# Patient Record
Sex: MALE | Race: WHITE | NOT HISPANIC OR LATINO | Employment: OTHER | URBAN - METROPOLITAN AREA
[De-identification: names, ages, dates, MRNs, and addresses within clinical notes are randomized per-mention and may not be internally consistent; named-entity substitution may affect disease eponyms.]

---

## 2018-11-12 ENCOUNTER — CONSULT (OUTPATIENT)
Dept: CARDIOLOGY | Facility: CLINIC | Age: 68
End: 2018-11-12

## 2018-11-12 VITALS
SYSTOLIC BLOOD PRESSURE: 134 MMHG | DIASTOLIC BLOOD PRESSURE: 74 MMHG | HEIGHT: 69 IN | OXYGEN SATURATION: 93 % | HEART RATE: 63 BPM | WEIGHT: 188 LBS | BODY MASS INDEX: 27.85 KG/M2

## 2018-11-12 DIAGNOSIS — I49.3 PVC (PREMATURE VENTRICULAR CONTRACTION): Primary | ICD-10-CM

## 2018-11-12 DIAGNOSIS — I49.1 PAC (PREMATURE ATRIAL CONTRACTION): ICD-10-CM

## 2018-11-12 DIAGNOSIS — I49.3 PVC'S (PREMATURE VENTRICULAR CONTRACTIONS): ICD-10-CM

## 2018-11-12 PROCEDURE — 99204 OFFICE O/P NEW MOD 45 MIN: CPT | Performed by: PHYSICIAN ASSISTANT

## 2018-11-12 NOTE — PROGRESS NOTES
[facility]  INITIAL OFFICE CONSULT      Jose Hoffman  1950  PCP: Yury Mari MD    SUBJECTIVE:   Jose Hoffman is a 68 y.o. male seen for a consultation visit regarding the following: Palpitations, PVC's, PAC's    Chief Complaint:   Chief Complaint   Patient presents with   • Rapid Heart Rate        Consultation is requested by Dr. Mari for evaluation of Rapid Heart Rate    Problem List:  1. Palpitations   A)PVC's   B)  Remote stress test, evaluation with Dr. Martinez 2007   C)Holter 10/18   5% PVC's with occasional PAC's   D)Intolerance to Beta blockers  2. GERD  3. Diverticulosis  4. Lipomas   5. Questionable Sleep Apnea     History:  Mr. Hoffman is a pleasant 68-year-old gentleman who presents today for consult regarding history of palpitations.  He remotely had a workup with Dr. Martinez approximately 2007 2008.  This included a Holter monitor and subsequent stress test that was negative.  He is placed on Xarelto therapy for PVCs but cannot tolerate secondary to bradycardia fatigue symptoms.  He states overall he didn't doing well with palpitations there are happening on a somewhat regular basis but are not overly bothersome.  Recently however the past several months she's noticed that these symptoms have progressively become worse percent nighttime and tries to sleep.  He reports that when he tries M been falsely pill started having palpitations and this wakes him up in his.  Irritating for him and keeping him up at night.  He states otherwise able to be physically active without symptoms.  He denies dizziness, near-syncope or syncope.  Denies heart flushed symptoms.  He presented to his primary care provider who recommended a Holter monitor which revealed 5% PVCs with occasional couplets and PACs.  He states today that he was a monitor was not particular bad day.  He then was tried on parenteral therapy which is yet to start.  He was interstitial pursuing  "other modes of treatment for the PVCs of these are quite bothersome to him.    Cardiac PMH: (Old records have been reviewed and summarized below)        Past Medical History, Past Surgical History, Family history, Social History, and Medications were all reviewed with the patient today and updated as necessary.     Current Outpatient Medications   Medication Sig Dispense Refill   • verapamil SR (CALAN-SR) 120 MG CR tablet Take 120 mg by mouth Daily.  2     No current facility-administered medications for this visit.      No Known Allergies      Past Medical History:   Diagnosis Date   • Tachycardia      History reviewed. No pertinent surgical history.  History reviewed. No pertinent family history.  Social History     Tobacco Use   • Smoking status: Never Smoker   • Smokeless tobacco: Never Used   Substance Use Topics   • Alcohol use: Yes     Frequency: 4 or more times a week       ROS:  Review of Symptoms:  General: no recent weight loss/gain, weakness or fatigue  Skin: no rashes, lumps, or other skin changes  HEENT: no dizziness, lightheadedness, or vision changes  Respiratory: no cough or hemoptysis  Cardiovascular: + palpitations, and tachycardia  Gastrointestinal: no black/tarry stools or diarrhea  Urinary: no change in frequency or urgency  Peripheral Vascular: no claudication or leg cramps  Musculoskeletal: no muscle or joint pain/stiffness  Psychiatric: no depression or excessive stress  Neurological: no sensory or motor loss, no syncope  Hematologic: no anemia, easy bruising or bleeding  Endocrine: no thyroid problems, nor heat or cold intolerance         PHYSICAL EXAM:   /74 (BP Location: Right arm, Patient Position: Sitting)   Pulse 63   Ht 175.3 cm (69\")   Wt 85.3 kg (188 lb)   SpO2 93%   BMI 27.76 kg/m²      Wt Readings from Last 5 Encounters:   11/12/18 85.3 kg (188 lb)   10/25/16 81.6 kg (180 lb)     BP Readings from Last 5 Encounters:   11/12/18 134/74       General-Well Nourished, Well " developed  Eyes - PERRLA  Neck- supple, No mass  CV- regular rate and rhythm, no MRG  Lung- clear bilaterally  Abd- soft, +BS  Musc/skel - Norm strength and range of motion  Skin- warm and dry  Neuro - Alert & Oriented x 3, appropriate mood.    Medical problems and test results were reviewed with the patient today.       EKG:  (EKG/Tracing has been independently visualized by me and summarized below)  EKG reviewed from PCP revealing Normal sinus rhythm.   Procedures    ASSESSMENT   1. Palpitations: Holter 10/18 revealing PVC's. Intolerance to BB secondary to BB.   2. Questionable KHADIJAH-Sleep study.       PLAN  · GXT MPS  · Echocardiogram  · Zio Patch  · Sleep study  · Patient would like to try Verapamil per PCP.  He is interested in Consult with EP regarding PVC's and treatment options..  [id number], StartLn & [id number]          Cardiology/Electrophysiology  11/12/18  4:10 PM  Will Shane [place]ORD

## 2018-11-14 DIAGNOSIS — R94.31 NONSPECIFIC ABNORMAL ELECTROCARDIOGRAM (ECG) (EKG): Primary | ICD-10-CM

## 2018-12-04 ENCOUNTER — OFFICE VISIT (OUTPATIENT)
Dept: CARDIOLOGY | Facility: CLINIC | Age: 68
End: 2018-12-04

## 2018-12-04 VITALS
HEART RATE: 64 BPM | WEIGHT: 187 LBS | BODY MASS INDEX: 27.7 KG/M2 | HEIGHT: 69 IN | SYSTOLIC BLOOD PRESSURE: 142 MMHG | DIASTOLIC BLOOD PRESSURE: 86 MMHG

## 2018-12-04 DIAGNOSIS — I49.3 PVC'S (PREMATURE VENTRICULAR CONTRACTIONS): Primary | ICD-10-CM

## 2018-12-04 PROCEDURE — 99214 OFFICE O/P EST MOD 30 MIN: CPT | Performed by: INTERNAL MEDICINE

## 2018-12-04 NOTE — PROGRESS NOTES
Jose Hoffman  1950  PCP: Yury Mari MD    SUBJECTIVE:   Jose Hoffman is a 68 y.o. male seen for a follow up visit regarding the following:     Chief Complaint: Follow up for PVCs palpitations    HPI:    Since last visit the patient's status has not changed.  He continues to have palpitations worse at night.    History:   Mr. Hoffman is a pleasant 68-year-old gentleman who presented for consult regarding history of palpitations.  He remotely had a workup with Dr. Martinez approximately 2007 2008.  This included a Holter monitor and subsequent stress test that was negative.  He is placed on beta blocker therapy for PVCs but cannot tolerate secondary to bradycardia fatigue symptoms.  Recently however the past several months she's noticed that these symptoms have progressively become worse percent nighttime and tries to sleep.  Irritating for him and keeping him up at night.  He states otherwise able to be physically active without symptoms.  He denies dizziness, near-syncope or syncope.  Denies heart flushed symptoms.        Cardiac PMH: (Old records have been reviewed and summarized below)  1. Palpitations              A)PVC's              B)  Remote stress test, evaluation with Dr. Martinez 2007              C)Holter 10/18   5% PVC's with occasional PAC's              D)Intolerance to Beta blockers  2. GERD  3. Diverticulosis  4. Lipomas   5. Questionable Sleep Apnea   6. Cardiac monitor-November 2018-PVC burden 1.2%-symptoms didn't correlate to PVCs.        Current Outpatient Medications:   •  verapamil SR (CALAN-SR) 120 MG CR tablet, Take 1 tablet by mouth Daily., Disp: 30 tablet, Rfl: 2    Past Medical History, Past Surgical History, Family history, Social History, and Medications were all reviewed with the patient today and updated as necessary.       Patient Active Problem List   Diagnosis   • PVC's (premature ventricular contractions)   • PAC (premature atrial contraction)     No Known  "Allergies  Past Medical History:   Diagnosis Date   • Arrhythmia 2002    Several tests performed, by Dr. Kei Wilkerson   • Tachycardia      Past Surgical History:   Procedure Laterality Date   • CARDIAC CATHETERIZATION  2002    performed by Dr Wilkerson, to rule out pulmanary artery clot     Family History   Problem Relation Age of Onset   • Arrhythmia Mother    • Heart failure Mother    • Hypertension Mother    • Heart attack Father    • Hypertension Father    • Arrhythmia Sister    • Hypertension Sister    • Arrhythmia Maternal Grandmother    • Heart failure Maternal Grandmother    • Hypertension Maternal Grandmother    • Arrhythmia Maternal Grandfather    • Heart attack Maternal Grandfather    • Heart failure Maternal Grandfather    • Heart failure Paternal Grandfather    • Hypertension Paternal Grandmother      Social History     Tobacco Use   • Smoking status: Never Smoker   • Smokeless tobacco: Never Used   Substance Use Topics   • Alcohol use: Yes     Types: 3 Cans of beer, 4 Shots of liquor per week     Frequency: 4 or more times a week         PHYSICAL EXAM:    /86 (BP Location: Right arm, Patient Position: Sitting)   Pulse 64   Ht 175.3 cm (69\")   Wt 84.8 kg (187 lb)   BMI 27.62 kg/m²        Wt Readings from Last 5 Encounters:   12/04/18 84.8 kg (187 lb)   11/12/18 85.3 kg (188 lb)   10/25/16 81.6 kg (180 lb)       BP Readings from Last 5 Encounters:   12/04/18 142/86   11/12/18 134/74       General-Well Nourished, Well developed  Eyes - PERRLA  Neck- supple, No mass  CV- regular rate and rhythm, no MRG, No edema  Lung- clear bilaterally  Abd- soft, +BS  Musc/skel - Norm strength and range of motion  Skin- warm and dry  Neuro - Alert & Oriented x 3, appropriate mood.        Medical problems and test results were reviewed with the patient today.     No results found for this or any previous visit (from the past 672 hour(s)).      EKG: (EKG has been independently visualized by me and summarized " below)    Procedures     ASSESSMENT and PLAN  1.  PVCs-burden is not enough for ablation.  He does have symptoms correlated to the PVCs and is mostly bothered at night.  We will start him on verapamil 120 mg each day.  He has a echo and nuclear stress test scheduled.  If the verapamil is unable to suppress the ectopy we will start the patient on flecainide therapy.      Return in about 4 weeks (around 1/1/2019).        Reed Zabala M.D., F.A.C.C, F.H.R.S.  Cardiology/Electrophysiology  12/4/2018  12:39 PM

## 2018-12-10 ENCOUNTER — HOSPITAL ENCOUNTER (OUTPATIENT)
Dept: CARDIOLOGY | Facility: HOSPITAL | Age: 68
Discharge: HOME | End: 2018-12-10

## 2018-12-10 ENCOUNTER — HOSPITAL ENCOUNTER (OUTPATIENT)
Dept: CARDIOLOGY | Facility: HOSPITAL | Age: 68
Discharge: HOME | End: 2018-12-10
Admitting: PHYSICIAN ASSISTANT

## 2018-12-10 VITALS
BODY MASS INDEX: 27.69 KG/M2 | SYSTOLIC BLOOD PRESSURE: 130 MMHG | DIASTOLIC BLOOD PRESSURE: 68 MMHG | HEART RATE: 62 BPM | WEIGHT: 186.95 LBS | HEIGHT: 69 IN

## 2018-12-10 DIAGNOSIS — I49.3 PVC (PREMATURE VENTRICULAR CONTRACTION): ICD-10-CM

## 2018-12-10 DIAGNOSIS — R94.31 NONSPECIFIC ABNORMAL ELECTROCARDIOGRAM (ECG) (EKG): ICD-10-CM

## 2018-12-10 LAB
BH CV ECHO MEAS - AO ROOT DIAM: 3.6 CM
BH CV ECHO MEAS - EDV(CUBED): 96.3 ML
BH CV ECHO MEAS - EF(MOD-BP): 63 %
BH CV ECHO MEAS - ESV(CUBED): 37.9 ML
BH CV ECHO MEAS - IVS/LVPW: 1.1 CM
BH CV ECHO MEAS - IVSD: 1 CM
BH CV ECHO MEAS - LAT PEAK E' VEL: 11.5 CM/SEC
BH CV ECHO MEAS - LVIDD: 4.6 CM
BH CV ECHO MEAS - LVIDS: 3.1 CM
BH CV ECHO MEAS - LVOT DIAM: 2 CM
BH CV ECHO MEAS - LVPWD: 0.9 CM
BH CV ECHO MEAS - MED PEAK E' VEL: 6.8 CM/SEC
BH CV ECHO MEAS - MV A MAX VEL: 76 CM/SEC
BH CV ECHO MEAS - MV E MAX VEL: 67.6 CM/SEC
BH CV ECHO MEAS - MV E/A: 0.9
BH CV ECHO MEAS - PA ACC SLOPE: 587 CM/SEC2
BH CV ECHO MEAS - RAP SYSTOLE: 8 MMHG
BH CV ECHO MEAS - RVSP: 27 MMHG
BH CV ECHO MEAS - TAPSE (>1.6): 2.4 CM2
BH CV ECHO MEAS - TR MAX PG: 19
BH CV ECHO MEAS - TR MAX VEL: 219 CM/SEC
BH CV ECHO MEASUREMENTS AVERAGE E/E' RATIO: 7.39
BH CV NUCLEAR PRIOR STUDY: 2
BH CV STRESS BP STAGE 1: NORMAL
BH CV STRESS BP STAGE 2: NORMAL
BH CV STRESS BP STAGE 4: NORMAL
BH CV STRESS COMMENTS STAGE 1: NORMAL
BH CV STRESS DOSE REGADENOSON STAGE 1: 0.4
BH CV STRESS DURATION MIN STAGE 1: 1
BH CV STRESS DURATION MIN STAGE 2: 1
BH CV STRESS DURATION MIN STAGE 3: 1
BH CV STRESS DURATION MIN STAGE 4: 1
BH CV STRESS DURATION SEC STAGE 1: 10
BH CV STRESS DURATION SEC STAGE 2: 0
BH CV STRESS HR STAGE 1: 70
BH CV STRESS HR STAGE 2: 78
BH CV STRESS HR STAGE 3: 76
BH CV STRESS HR STAGE 4: 75
BH CV STRESS O2 STAGE 1: 100
BH CV STRESS PROTOCOL 1: NORMAL
BH CV STRESS RECOVERY BP: NORMAL MMHG
BH CV STRESS RECOVERY HR: 71 BPM
BH CV STRESS STAGE 1: 1
BH CV STRESS STAGE 2: 2
BH CV STRESS STAGE 3: 3
BH CV STRESS STAGE 4: 4
BH CV VAS BP LEFT ARM: NORMAL MMHG
BH CV XLRA - RV BASE: 4.7 CM
BH CV XLRA - RV LENGTH: 8.3 CM
BH CV XLRA - RV MID: 4 CM
BH CV XLRA - TDI S': 9.87 CM/SEC
IVRT: 108 MSEC
LEFT ATRIUM VOLUME INDEX: 25 ML/M2
LV EF 2D ECHO EST: 63 %
LV EF NUC BP: 64 %
MAXIMAL PREDICTED HEART RATE: 153 BPM
MAXIMAL PREDICTED HEART RATE: 153 BPM
PERCENT MAX PREDICTED HR: 52.29 %
STRESS BASELINE BP: NORMAL MMHG
STRESS BASELINE HR: 60 BPM
STRESS PERCENT HR: 62 %
STRESS POST PEAK BP: NORMAL MMHG
STRESS POST PEAK HR: 80 BPM
STRESS TARGET HR: 130 BPM
STRESS TARGET HR: 130 BPM

## 2018-12-10 PROCEDURE — 25010000002 REGADENOSON 0.4 MG/5ML SOLUTION: Performed by: PHYSICIAN ASSISTANT

## 2018-12-10 PROCEDURE — 0 RUBIDIUM CHLORIDE: Performed by: PHYSICIAN ASSISTANT

## 2018-12-10 PROCEDURE — A9555 RB82 RUBIDIUM: HCPCS | Performed by: PHYSICIAN ASSISTANT

## 2018-12-10 PROCEDURE — 78492 MYOCRD IMG PET MLT RST&STRS: CPT

## 2018-12-10 PROCEDURE — 93018 CV STRESS TEST I&R ONLY: CPT | Performed by: INTERNAL MEDICINE

## 2018-12-10 PROCEDURE — 78492 MYOCRD IMG PET MLT RST&STRS: CPT | Performed by: INTERNAL MEDICINE

## 2018-12-10 PROCEDURE — 93306 TTE W/DOPPLER COMPLETE: CPT

## 2018-12-10 PROCEDURE — 93306 TTE W/DOPPLER COMPLETE: CPT | Performed by: INTERNAL MEDICINE

## 2018-12-10 PROCEDURE — 93017 CV STRESS TEST TRACING ONLY: CPT

## 2018-12-10 RX ADMIN — REGADENOSON 0.4 MG: 0.08 INJECTION, SOLUTION INTRAVENOUS at 10:18

## 2018-12-10 RX ADMIN — RUBIDIUM CHLORIDE RB-82 1 DOSE: 150 INJECTION, SOLUTION INTRAVENOUS at 10:10

## 2018-12-10 RX ADMIN — RUBIDIUM CHLORIDE RB-82 1 DOSE: 150 INJECTION, SOLUTION INTRAVENOUS at 10:00

## 2018-12-11 ENCOUNTER — TELEPHONE (OUTPATIENT)
Dept: CARDIOLOGY | Facility: CLINIC | Age: 68
End: 2018-12-11

## 2018-12-11 NOTE — TELEPHONE ENCOUNTER
Message   Received: Today   Message Contents   Jose Lynn PA Childers, Tracy R, RN             Can you please let Mr. Hoffman know Stress test and Echo are acceptable.    Previous Messages             Patient notified and aware.

## 2019-01-08 ENCOUNTER — OFFICE VISIT (OUTPATIENT)
Dept: CARDIOLOGY | Facility: CLINIC | Age: 69
End: 2019-01-08

## 2019-01-08 VITALS
HEIGHT: 69 IN | HEART RATE: 58 BPM | DIASTOLIC BLOOD PRESSURE: 68 MMHG | BODY MASS INDEX: 28.41 KG/M2 | OXYGEN SATURATION: 98 % | WEIGHT: 191.8 LBS | SYSTOLIC BLOOD PRESSURE: 118 MMHG

## 2019-01-08 DIAGNOSIS — I49.3 PVC'S (PREMATURE VENTRICULAR CONTRACTIONS): Primary | ICD-10-CM

## 2019-01-08 PROCEDURE — 99214 OFFICE O/P EST MOD 30 MIN: CPT | Performed by: INTERNAL MEDICINE

## 2019-01-08 NOTE — PROGRESS NOTES
Jose Hoffman  1950  PCP: Yury Mari MD    SUBJECTIVE:   Jose Hoffman is a 68 y.o. male seen for a follow up visit regarding the following:     Chief Complaint: Follow up for PVCs palpitations    HPI:    Since last visit the patient's status has improved some. Tolerating meds    History:   Mr. Hoffman is a pleasant 68-year-old gentleman who presented for consult regarding history of palpitations.  He remotely had a workup with Dr. Martinez approximately 2007 2008.  This included a Holter monitor and subsequent stress test that was negative.  He is placed on beta blocker therapy for PVCs but cannot tolerate secondary to bradycardia fatigue symptoms.  Recently however the past several months she's noticed that these symptoms have progressively become worse percent nighttime and tries to sleep.  Irritating for him and keeping him up at night.  He states otherwise able to be physically active without symptoms.  He denies dizziness, near-syncope or syncope.  Denies heart flushed symptoms.        Cardiac PMH: (Old records have been reviewed and summarized below)  1. Palpitations              A)PVC's              B)  Remote stress test, evaluation with Dr. Martinez 2007              C)Holter 10/18   5% PVC's with occasional PAC's              D)Intolerance to Beta blockers  2. GERD  3. Diverticulosis  4. Lipomas   5. Questionable Sleep Apnea   6. Cardiac monitor-November 2018-PVC burden 1.2%-symptoms didn't correlate to PVCs.  7. Stress Test - Nov 2018 - Myocardial perfusion imaging indicates a normal myocardial perfusion study with no evidence of ischemia  8. ECHO - Nov 2018 - Left ventricular systolic function is normal. Calculated EF = 63%        Current Outpatient Medications:   •  verapamil SR (CALAN-SR) 120 MG CR tablet, Take 1 tablet by mouth Daily., Disp: 30 tablet, Rfl: 2    Past Medical History, Past Surgical History, Family history, Social History, and Medications were all reviewed with the  "patient today and updated as necessary.       Patient Active Problem List   Diagnosis   • PVC's (premature ventricular contractions)   • PAC (premature atrial contraction)     No Known Allergies  Past Medical History:   Diagnosis Date   • Arrhythmia 2002    Several tests performed, by Dr. Kei Wilkerson   • Tachycardia      Past Surgical History:   Procedure Laterality Date   • CARDIAC CATHETERIZATION  2002    performed by Dr Wilkerson, to rule out pulmanary artery clot     Family History   Problem Relation Age of Onset   • Arrhythmia Mother    • Heart failure Mother    • Hypertension Mother    • Heart attack Father    • Hypertension Father    • Arrhythmia Sister    • Hypertension Sister    • Arrhythmia Maternal Grandmother    • Heart failure Maternal Grandmother    • Hypertension Maternal Grandmother    • Arrhythmia Maternal Grandfather    • Heart attack Maternal Grandfather    • Heart failure Maternal Grandfather    • Heart failure Paternal Grandfather    • Hypertension Paternal Grandmother      Social History     Tobacco Use   • Smoking status: Never Smoker   • Smokeless tobacco: Never Used   Substance Use Topics   • Alcohol use: Yes     Alcohol/week: 1.2 - 4.2 oz     Types: 1 - 3 Cans of beer, 1 - 4 Shots of liquor per week     Frequency: 4 or more times a week     Comment: occas         PHYSICAL EXAM:    /68 (BP Location: Left arm, Patient Position: Sitting)   Pulse 58   Ht 175.3 cm (69\")   Wt 87 kg (191 lb 12.8 oz)   SpO2 98%   BMI 28.32 kg/m²        Wt Readings from Last 5 Encounters:   01/08/19 87 kg (191 lb 12.8 oz)   12/10/18 84.8 kg (186 lb 15.2 oz)   12/04/18 84.8 kg (187 lb)   11/12/18 85.3 kg (188 lb)   10/25/16 81.6 kg (180 lb)       BP Readings from Last 5 Encounters:   01/08/19 118/68   12/10/18 130/68   12/04/18 142/86   11/12/18 134/74       General-Well Nourished, Well developed  Eyes - PERRLA  Neck- supple, No mass  CV- regular rate and rhythm, no MRG, No edema  Lung- clear " bilaterally  Abd- soft, +BS  Musc/skel - Norm strength and range of motion  Skin- warm and dry  Neuro - Alert & Oriented x 3, appropriate mood.        Medical problems and test results were reviewed with the patient today.     No results found for this or any previous visit (from the past 672 hour(s)).      EKG: (EKG has been independently visualized by me and summarized below)    Procedures     ASSESSMENT and PLAN  1.  PVCs-burden is not enough for ablation.  He does have symptoms correlated to the PVCs and is mostly bothered at night.  We started him on verapamil 120 mg each day.  He had a echo and nuclear stress test with stable findings..  If the verapamil is unable to suppress the ectopy we will start the patient on flecainide therapy.      Return in about 6 months (around 7/8/2019).        Reed Zabala M.D., FASHWINC, F.H.R.S.  Cardiology/Electrophysiology  1/8/2019  11:13 AM

## 2019-01-16 ENCOUNTER — CONSULT (OUTPATIENT)
Dept: SLEEP MEDICINE | Facility: HOSPITAL | Age: 69
End: 2019-01-16

## 2019-01-16 VITALS
DIASTOLIC BLOOD PRESSURE: 67 MMHG | HEART RATE: 54 BPM | WEIGHT: 187.4 LBS | OXYGEN SATURATION: 95 % | HEIGHT: 69 IN | BODY MASS INDEX: 27.76 KG/M2 | SYSTOLIC BLOOD PRESSURE: 118 MMHG

## 2019-01-16 DIAGNOSIS — E66.3 OVERWEIGHT: ICD-10-CM

## 2019-01-16 DIAGNOSIS — R06.83 SNORING: Primary | ICD-10-CM

## 2019-01-16 DIAGNOSIS — G47.33 OBSTRUCTIVE SLEEP APNEA, ADULT: ICD-10-CM

## 2019-01-16 DIAGNOSIS — I49.3 PVC'S (PREMATURE VENTRICULAR CONTRACTIONS): ICD-10-CM

## 2019-01-16 PROCEDURE — 99203 OFFICE O/P NEW LOW 30 MIN: CPT | Performed by: INTERNAL MEDICINE

## 2019-01-16 NOTE — PROGRESS NOTES
Subjective   Jose Hoffman is a 68 y.o. male is being seen for consultation today at the request of KADEN Cadena for the evaluation of snoring and possible sleep-disordered breathing.  His primary care physician is Dr. Mari.  He is also seen by Dr. Zabala    History of Present Illness  Patient states he's been having palpitations for several years when he tries to rest.  He was diagnosed with PVCs.  He previously was on atenolol but it made him very sleepy was falling asleep at stop lights.  He is now on verapamil and doing better.  He sleeps better.  He says he still occasionally wakens with a sensation that his had PVC.  He is referred for evaluation of sleep-disordered breathing as possible contributing factor.  He also has a history of spinal stenosis and back pain awakens him.  He recently has taken 2 taping his mouth shut to help with his snoring.  He recently had a stress test that showed only some minor abnormalities.  He's been sleeping bed.  Separate from his wife he says due to her need for different temperature sleeping.    He says he's had snoring all of his life.  He denies having apneas.  He will awaken with a jolt but denies feeling like he is gasping for breath.  He's usually rested in the morning.  He denies morning headaches.  He is still sleepy during the day but is not falling asleep at stop lights now.  He has loud snoring it's worse when he saw on his back.  He says he usually sleeps on his side due to back pain.  He denies waking choking coughing or sore throat.  He denies trouble breathing through his nose.  He did break his nose at 13 years old.  He has a history of reflux that he controls with diet.  He denies hypnagogic hallucination or sleep paralysis.  He does have back pain but denies kicking or jerking his legs at night.  He says his weight is fairly stable.    He goes to bed about 11 PM and will fall asleep in 3-5 minutes.  He awakens 3-5 times during the night.  He  thinks he gets 5 and half to 6    Hours of sleep and generally feels fairly good.  He denies any history of diabetes or hypertension.  He is not been found to have coronary artery disease.  He does have PVCs and arthritis.  No Known Allergies       Current Outpatient Medications:   •  verapamil SR (CALAN-SR) 120 MG CR tablet, Take 1 tablet by mouth Daily., Disp: 30 tablet, Rfl: 2    Social History    Tobacco Use      Smoking status: Never Smoker      Smokeless tobacco: Never Used       Social History     Substance and Sexual Activity   Alcohol Use Yes   • Alcohol/week: 1.2 - 4.2 oz   • Types: 1 - 3 Cans of beer, 1 - 4 Shots of liquor per week   • Frequency: 4 or more times a week    Comment: occas       Caffeine: He has 4 cups coffee per day    Past Medical History:   Diagnosis Date   • Arrhythmia 2002    Several tests performed, by Dr. Kei Wilkerson   • Arthritis    • Tachycardia        Past Surgical History:   Procedure Laterality Date   • CARDIAC CATHETERIZATION  2002    performed by Dr Wilkerson, to rule out pulmanary artery clot   • TONSILLECTOMY     He's had an appendectomy several years ago    Family History   Problem Relation Age of Onset   • Arrhythmia Mother    • Heart failure Mother    • Hypertension Mother    • Heart disease Mother    • COPD Mother    • Heart attack Father    • Hypertension Father    • Heart disease Father    • Cancer Father         lung   • Arrhythmia Sister    • Hypertension Sister    • Sleep apnea Sister    • Arrhythmia Maternal Grandmother    • Heart failure Maternal Grandmother    • Hypertension Maternal Grandmother    • Arrhythmia Maternal Grandfather    • Heart attack Maternal Grandfather    • Heart failure Maternal Grandfather    • Heart failure Paternal Grandfather    • Hypertension Paternal Grandmother        The following portions of the patient's history were reviewed and updated as appropriate: allergies, current medications, past family history, past medical history, past  "social history, past surgical history and problem list.    Review of Systems   Constitutional: Negative.    HENT: Negative.    Eyes: Negative.    Respiratory: Negative.    Cardiovascular: Positive for palpitations.   Gastrointestinal: Negative.    Endocrine: Negative.    Genitourinary: Negative.    Musculoskeletal: Positive for arthralgias and back pain.   Skin: Negative.    Allergic/Immunologic: Negative.    Neurological: Negative.    Hematological: Negative.    Psychiatric/Behavioral: Negative.     [place] scores 11/24    Objective     /67   Pulse 54   Ht 175.3 cm (69\")   Wt 85 kg (187 lb 6.4 oz)   SpO2 95%   BMI 27.67 kg/m²      Physical Exam   Constitutional: He is oriented to person, place, and time. He appears well-developed and well-nourished.   He is slightly overweight   HENT:   Head: Normocephalic and atraumatic.   His nasal airway narrowing with some septal deviation to the left.  He has Mallampati class to anatomy   Eyes: EOM are normal. Pupils are equal, round, and reactive to light.   Neck: Normal range of motion. Neck supple.   Cardiovascular: Normal rate, regular rhythm and normal heart sounds.   Pulmonary/Chest: Effort normal and breath sounds normal.   Abdominal: Soft. Bowel sounds are normal.   Musculoskeletal: Normal range of motion. He exhibits no edema.   Neurological: He is alert and oriented to person, place, and time.   Skin: Skin is warm and dry.   Psychiatric: He has a normal mood and affect. His behavior is normal.         Assessment/Plan   Jose was seen today for sleeping problem.    Diagnoses and all orders for this visit:    Snoring  -     Polysomnography 4 or More Parameters; Future    Obstructive sleep apnea, adult  -     Polysomnography 4 or More Parameters; Future    PVC's (premature ventricular contractions)    Overweight     patient resents with a history of snoring and is had frequent PVCs noted in the past.  I think is fairly good story for obstructive sleep " apnea.  We'll plan to proceed to polysomnogram.  I've discussed potential therapies including CPAP, weight control, oral appliances, and surgery.  We have also discussed the long-term consequences of untreated obstructive sleep apnea.  He is return then after his study.    He is encouraged to achieve ideal body weight.  He is encouraged to avoid alcohol and sedatives close to bedtime.  He is encouraged practice lateral position sleep.         Yury Flynn MD [facility]  Sleep Medicine  Pulmonary and Critical Care Medicine

## 2019-01-22 ENCOUNTER — APPOINTMENT (OUTPATIENT)
Dept: SLEEP MEDICINE | Facility: HOSPITAL | Age: 69
End: 2019-01-22
Attending: INTERNAL MEDICINE

## 2019-07-16 ENCOUNTER — OFFICE VISIT (OUTPATIENT)
Dept: CARDIOLOGY | Facility: CLINIC | Age: 69
End: 2019-07-16

## 2019-07-16 VITALS
HEIGHT: 69 IN | WEIGHT: 194 LBS | DIASTOLIC BLOOD PRESSURE: 72 MMHG | SYSTOLIC BLOOD PRESSURE: 134 MMHG | OXYGEN SATURATION: 98 % | HEART RATE: 53 BPM | BODY MASS INDEX: 28.73 KG/M2

## 2019-07-16 DIAGNOSIS — I49.3 PVC'S (PREMATURE VENTRICULAR CONTRACTIONS): Primary | ICD-10-CM

## 2019-07-16 PROCEDURE — 99213 OFFICE O/P EST LOW 20 MIN: CPT | Performed by: INTERNAL MEDICINE

## 2019-07-16 NOTE — PROGRESS NOTES
Jose Hoffman  1950  PCP: Yury Mari MD    SUBJECTIVE:   Jose Hoffman is a 69 y.o. male seen for a follow up visit regarding the following:     Chief Complaint: Follow up for PVCs palpitations    HPI:    Since last visit the patient's status has improved some. Tolerating meds. Taking Verapamil as needed. Used 18 pills over 6 months    History:   Mr. Hoffman is a pleasant 69-year-old gentleman who presented for consult regarding history of palpitations.  He remotely had a workup with Dr. Martinez approximately 2446-9162.  This included a Holter monitor and subsequent stress test that was negative.  He is placed on beta blocker therapy for PVCs but cannot tolerate secondary to bradycardia fatigue symptoms.  Recently however the past several months she's noticed that these symptoms have progressively become worse percent nighttime and tries to sleep.  Irritating for him and keeping him up at night.  He states otherwise able to be physically active without symptoms.  He denies dizziness, near-syncope or syncope.  Denies heart flushed symptoms.        Cardiac PMH: (Old records have been reviewed and summarized below)  1. Palpitations              A)PVC's              B)Remote stress test, evaluation with Dr. Martinez 2007              C)Holter 10/18   5% PVC's with occasional PAC's              D)Intolerance to Beta blockers  2. GERD  3. Diverticulosis  4. Lipomas   5. Questionable Sleep Apnea   6. Cardiac monitor-November 2018-PVC burden 1.2%-symptoms didn't correlate to PVCs.  7. Stress Test - Nov 2018 - Myocardial perfusion imaging indicates a normal myocardial perfusion study with no evidence of ischemia  8. ECHO - Nov 2018 - Left ventricular systolic function is normal. Calculated EF = 63%        Current Outpatient Medications:   •  verapamil SR (CALAN-SR) 120 MG CR tablet, Take 1 tablet by mouth Daily. (Patient taking differently: Take 120 mg by mouth As Needed.), Disp: 30 tablet, Rfl: 2    Past  "Medical History, Past Surgical History, Family history, Social History, and Medications were all reviewed with the patient today and updated as necessary.       Patient Active Problem List   Diagnosis   • PVC's (premature ventricular contractions)   • PAC (premature atrial contraction)   • Snoring   • Overweight     No Known Allergies  Past Medical History:   Diagnosis Date   • Arrhythmia 2002    Several tests performed, by Dr. Kei Wilkerson   • Arthritis    • Tachycardia      Past Surgical History:   Procedure Laterality Date   • CARDIAC CATHETERIZATION  2002    performed by Dr Wilkerson, to rule out pulmanary artery clot   • TONSILLECTOMY       Family History   Problem Relation Age of Onset   • Arrhythmia Mother    • Heart failure Mother    • Hypertension Mother    • Heart disease Mother    • COPD Mother    • Heart attack Father    • Hypertension Father    • Heart disease Father    • Cancer Father         lung   • Arrhythmia Sister    • Hypertension Sister    • Sleep apnea Sister    • Arrhythmia Maternal Grandmother    • Heart failure Maternal Grandmother    • Hypertension Maternal Grandmother    • Arrhythmia Maternal Grandfather    • Heart attack Maternal Grandfather    • Heart failure Maternal Grandfather    • Heart failure Paternal Grandfather    • Hypertension Paternal Grandmother      Social History     Tobacco Use   • Smoking status: Never Smoker   • Smokeless tobacco: Never Used   Substance Use Topics   • Alcohol use: Yes     Alcohol/week: 1.2 - 4.2 oz     Types: 1 - 3 Cans of beer, 1 - 4 Shots of liquor per week     Frequency: 4 or more times a week     Comment: occas         PHYSICAL EXAM:    /72 (BP Location: Right arm, Patient Position: Sitting)   Pulse 53   Ht 175.3 cm (69\")   Wt 88 kg (194 lb)   SpO2 98%   BMI 28.65 kg/m²        Wt Readings from Last 5 Encounters:   07/16/19 88 kg (194 lb)   01/16/19 85 kg (187 lb 6.4 oz)   01/08/19 87 kg (191 lb 12.8 oz)   12/10/18 84.8 kg (186 lb 15.2 oz) "   12/04/18 84.8 kg (187 lb)       BP Readings from Last 5 Encounters:   07/16/19 134/72   01/16/19 118/67   01/08/19 118/68   12/10/18 130/68   12/04/18 142/86       General-Well Nourished, Well developed  Eyes - PERRLA  Neck- supple, No mass  CV- regular rate and rhythm, no MRG, No edema  Lung- clear bilaterally  Abd- soft, +BS  Musc/skel - Norm strength and range of motion  Skin- warm and dry  Neuro - Alert & Oriented x 3, appropriate mood.        Medical problems and test results were reviewed with the patient today.     No results found for this or any previous visit (from the past 672 hour(s)).      EKG: (EKG has been independently visualized by me and summarized below)    Procedures     ASSESSMENT and PLAN  1.  PVCs-burden is not enough for ablation.  He does have symptoms correlated to the PVCs and is mostly bothered at night.  We started him on verapamil 120 mg each day.  He had a echo and nuclear stress test with stable findings.  If the verapamil is unable to suppress the ectopy we will start the patient on flecainide therapy.  2. ? Sleep apnea - Did not get sleep study      Return in about 6 months (around 1/16/2020).        Reed Zabala M.D., F.A.C.C, F.H.R.S.  Cardiology/Electrophysiology  7/16/2019  9:53 AM

## 2020-03-17 ENCOUNTER — OFFICE VISIT (OUTPATIENT)
Dept: CARDIOLOGY | Facility: CLINIC | Age: 70
End: 2020-03-17

## 2020-03-17 VITALS
BODY MASS INDEX: 25.33 KG/M2 | HEART RATE: 60 BPM | OXYGEN SATURATION: 98 % | SYSTOLIC BLOOD PRESSURE: 120 MMHG | DIASTOLIC BLOOD PRESSURE: 64 MMHG | HEIGHT: 69 IN | WEIGHT: 171 LBS

## 2020-03-17 DIAGNOSIS — I49.3 PVC'S (PREMATURE VENTRICULAR CONTRACTIONS): ICD-10-CM

## 2020-03-17 DIAGNOSIS — I49.1 PAC (PREMATURE ATRIAL CONTRACTION): Primary | ICD-10-CM

## 2020-03-17 PROCEDURE — 99214 OFFICE O/P EST MOD 30 MIN: CPT | Performed by: INTERNAL MEDICINE

## 2020-03-17 PROCEDURE — 93000 ELECTROCARDIOGRAM COMPLETE: CPT | Performed by: INTERNAL MEDICINE

## 2020-03-17 NOTE — PROGRESS NOTES
Jose Hoffman  1950  PCP: Yury Mari MD    SUBJECTIVE:   Jose Hoffman is a 69 y.o. male seen for a follow up visit regarding the following:     Chief Complaint: Follow up for PVCs palpitations    HPI:    Since last visit the patient's status has improved some. Tolerating meds. Taking Verapamil as needed, reports that he rarely uses them. He has lost over 20 pounds.     History:   Mr. Hoffman is a pleasant 69-year-old gentleman who presented for consult regarding history of palpitations.  He remotely had a workup with Dr. Martinez approximately [id number].  This included a Holter monitor and subsequent stress test that was negative.  He is placed on beta blocker therapy for PVCs but cannot tolerate secondary to bradycardia fatigue symptoms.  Recently however the past several months she's noticed that these symptoms have progressively become worse percent nighttime and tries to sleep.  Irritating for him and keeping him up at night.  He states otherwise able to be physically active without symptoms.  He denies dizziness, near-syncope or syncope.  Denies heart flushed symptoms.        Cardiac PMH: (Old records have been reviewed and summarized below)  1. Palpitations              A)PVC's              B)Remote stress test, evaluation with Dr. Martinez 2007              C)Holter 10/18   5% PVC's with occasional PAC's              D)Intolerance to Beta blockers  2. GERD  3. Diverticulosis  4. Lipomas   5. Questionable Sleep Apnea   6. Cardiac monitor-November 2018-PVC burden 1.2%-symptoms didn't correlate to PVCs.  7. Stress Test - Nov 2018 - Myocardial perfusion imaging indicates a normal myocardial perfusion study with no evidence of ischemia  8. ECHO - Nov 2018 - Left ventricular systolic function is normal. Calculated EF = 63%        Current Outpatient Medications:   •  verapamil SR (CALAN-SR) 120 MG CR tablet, Take 1 tablet by mouth Daily., Disp: 30 tablet, Rfl: 11    Past Medical History, Past  "Surgical History, Family history, Social History, and Medications were all reviewed with the patient today and updated as necessary.       Patient Active Problem List   Diagnosis   • PVC's (premature ventricular contractions)   • PAC (premature atrial contraction)   • Snoring   • Overweight     No Known Allergies  Past Medical History:   Diagnosis Date   • Arrhythmia 2002    Several tests performed, by Dr. Kei Wilkerson   • Arthritis    • Tachycardia      Past Surgical History:   Procedure Laterality Date   • CARDIAC CATHETERIZATION  2002    performed by Dr Wilkerson, to rule out pulmanary artery clot   • TONSILLECTOMY       Family History   Problem Relation Age of Onset   • Arrhythmia Mother    • Heart failure Mother    • Hypertension Mother    • Heart disease Mother    • COPD Mother    • Heart attack Father    • Hypertension Father    • Heart disease Father    • Cancer Father         lung   • Arrhythmia Sister    • Hypertension Sister    • Sleep apnea Sister    • Arrhythmia Maternal Grandmother    • Heart failure Maternal Grandmother    • Hypertension Maternal Grandmother    • Arrhythmia Maternal Grandfather    • Heart attack Maternal Grandfather    • Heart failure Maternal Grandfather    • Heart failure Paternal Grandfather    • Hypertension Paternal Grandmother      Social History     Tobacco Use   • Smoking status: Never Smoker   • Smokeless tobacco: Never Used   Substance Use Topics   • Alcohol use: Yes     Alcohol/week: 2.0 - 7.0 standard drinks     Types: 1 - 3 Cans of beer, 1 - 4 Shots of liquor per week     Frequency: 4 or more times a week     Comment: occas         PHYSICAL EXAM:    /64 (BP Location: Left arm, Patient Position: Sitting)   Pulse 60   Ht 175.3 cm (69\")   Wt 77.6 kg (171 lb)   SpO2 98%   BMI 25.25 kg/m²        Wt Readings from Last 5 Encounters:   03/17/20 77.6 kg (171 lb)   07/16/19 88 kg (194 lb)   01/16/19 85 kg (187 lb 6.4 oz)   01/08/19 87 kg (191 lb 12.8 oz)   12/10/18 84.8 kg " (186 lb 15.2 oz)       BP Readings from Last 5 Encounters:   03/17/20 120/64   07/16/19 134/72   01/16/19 118/67   01/08/19 118/68   12/10/18 130/68       General-Well Nourished, Well developed  Eyes - PERRLA  Neck- supple, No mass  CV- regular rate and rhythm, no MRG, No edema  Lung- clear bilaterally  Abd- soft, +BS  Musc/skel - Norm strength and range of motion  Skin- warm and dry  Neuro - Alert & Oriented x 3, appropriate mood.        Medical problems and test results were reviewed with the patient today.     No results found for this or any previous visit (from the past 672 hour(s)).      EKG: (EKG has been independently visualized by me and summarized below)      ECG 12 Lead  Date/Time: 3/17/2020 9:31 AM  Performed by: Reed Zabala MD  Authorized by: Reed Zabala MD   Comparison: compared with previous ECG   Similar to previous ECG  Rhythm: sinus rhythm  Rate: normal  BPM: 59  ST Segments: ST segments normal  T Waves: T waves normal    Clinical impression: normal ECG             ASSESSMENT and PLAN  1.  PVCs-burden is not enough for ablation.  He does have symptoms correlated to the PVCs and is mostly bothered at night.  We started him on verapamil 120 mg each day.  He had a echo and nuclear stress test with stable findings.  If the verapamil is unable to suppress the ectopy we will start the patient on flecainide therapy.  2. ? Sleep apnea - Did not get sleep study. Reports that he has lost  3. Pre-op surgical clearance - Patient is low cardiac risk. Stable ECHO and Stress test findings.       Return in about 1 year (around 3/17/2021).        Reed Zabala M.D., F.A.C.C, F.H.R.S.  Cardiology/Electrophysiology  3/17/2020  09:31

## 2020-03-18 ENCOUNTER — TRANSCRIBE ORDERS (OUTPATIENT)
Dept: GENERAL RADIOLOGY | Facility: HOSPITAL | Age: 70
End: 2020-03-18

## 2020-03-18 ENCOUNTER — HOSPITAL ENCOUNTER (OUTPATIENT)
Dept: GENERAL RADIOLOGY | Facility: HOSPITAL | Age: 70
Discharge: HOME | End: 2020-03-18
Admitting: FAMILY MEDICINE

## 2020-03-18 DIAGNOSIS — Z01.818 PRE-OPERATIVE EXAM: ICD-10-CM

## 2020-03-18 DIAGNOSIS — Z01.818 PRE-OPERATIVE EXAM: Primary | ICD-10-CM

## 2020-03-18 PROCEDURE — 71046 X-RAY EXAM CHEST 2 VIEWS: CPT

## 2020-09-15 ENCOUNTER — TRANSCRIBE ORDERS (OUTPATIENT)
Dept: ADMINISTRATIVE | Facility: HOSPITAL | Age: 70
End: 2020-09-15

## 2020-09-15 ENCOUNTER — HOSPITAL ENCOUNTER (OUTPATIENT)
Dept: MRI IMAGING | Facility: HOSPITAL | Age: 70
Discharge: HOME | End: 2020-09-15
Admitting: FAMILY MEDICINE

## 2020-09-15 DIAGNOSIS — G45.9 TIA (TRANSIENT ISCHEMIC ATTACK): ICD-10-CM

## 2020-09-15 DIAGNOSIS — G45.9 TIA (TRANSIENT ISCHEMIC ATTACK): Primary | ICD-10-CM

## 2020-09-15 PROCEDURE — 70551 MRI BRAIN STEM W/O DYE: CPT

## 2021-02-09 ENCOUNTER — OFFICE VISIT (OUTPATIENT)
Dept: CARDIOLOGY | Facility: CLINIC | Age: 71
End: 2021-02-09

## 2021-02-09 VITALS
WEIGHT: 172.6 LBS | HEART RATE: 63 BPM | OXYGEN SATURATION: 98 % | BODY MASS INDEX: 25.56 KG/M2 | SYSTOLIC BLOOD PRESSURE: 118 MMHG | HEIGHT: 69 IN | DIASTOLIC BLOOD PRESSURE: 76 MMHG

## 2021-02-09 DIAGNOSIS — I49.3 PVC'S (PREMATURE VENTRICULAR CONTRACTIONS): Primary | ICD-10-CM

## 2021-02-09 DIAGNOSIS — I63.50 CEREBROVASCULAR ACCIDENT (CVA) DUE TO OCCLUSION OF CEREBRAL ARTERY (HCC): ICD-10-CM

## 2021-02-09 DIAGNOSIS — I49.1 PAC (PREMATURE ATRIAL CONTRACTION): ICD-10-CM

## 2021-02-09 PROCEDURE — 99214 OFFICE O/P EST MOD 30 MIN: CPT | Performed by: INTERNAL MEDICINE

## 2021-02-09 PROCEDURE — 93000 ELECTROCARDIOGRAM COMPLETE: CPT | Performed by: INTERNAL MEDICINE

## 2021-02-09 RX ORDER — ASPIRIN 81 MG/1
81 TABLET, CHEWABLE ORAL DAILY
COMMUNITY

## 2021-02-09 RX ORDER — MAGNESIUM OXIDE/MAG AA CHELATE 300 MG
1 CAPSULE ORAL DAILY
COMMUNITY

## 2021-02-09 RX ORDER — LISINOPRIL 5 MG/1
5 TABLET ORAL DAILY
COMMUNITY
Start: 2021-02-02 | End: 2022-09-13

## 2021-02-09 RX ORDER — ZINC 25 MG
TABLET ORAL
COMMUNITY
End: 2021-09-07

## 2021-02-09 NOTE — PROGRESS NOTES
Jose Hoffman  1950  PCP: Yury Mari MD    SUBJECTIVE:   Jose Hoffman is a 70 y.o. male seen for a follow up visit regarding the following:     Chief Complaint: Follow up for PVCs palpitations    HPI:    Since last visit the patient's status has improved some. He had a Hip replacement and a TIA secondary to small vessel dz.     History:   Mr. Hoffman is a pleasant 70-year-old gentleman who presented for consult regarding history of palpitations.  He remotely had a workup with Dr. Martinez approximately [id number].  This included a Holter monitor and subsequent stress test that was negative.  He is placed on beta blocker therapy for PVCs but cannot tolerate secondary to bradycardia fatigue symptoms.  Recently however the past several months she's noticed that these symptoms have progressively become worse percent nighttime and tries to sleep.  Irritating for him and keeping him up at night.  He states otherwise able to be physically active without symptoms.  He denies dizziness, near-syncope or syncope.  Denies heart flushed symptoms.        Cardiac PMH: (Old records have been reviewed and summarized below)  1. Palpitations              A)PVC's              B)Remote stress test, evaluation with Dr. Martinez 2007              C)Holter 10/18   5% PVC's with occasional PAC's              D)Intolerance to Beta blockers  2. GERD  3. Diverticulosis  4. Lipomas   5. Questionable Sleep Apnea   6. Cardiac monitor-November 2018-PVC burden 1.2%-symptoms didn't correlate to PVCs.  7. Stress Test - Nov 2018 - Myocardial perfusion imaging indicates a normal myocardial perfusion study with no evidence of ischemia  8. ECHO - Nov 2018 - Left ventricular systolic function is normal. Calculated EF = 63%        Current Outpatient Medications:   •  aspirin 81 MG chewable tablet, Chew 81 mg Daily., Disp: , Rfl:   •  KRILL OIL PO, Take 1,500 mg by mouth Daily., Disp: , Rfl:   •  lisinopril (PRINIVIL,ZESTRIL) 5 MG tablet,  Take 5 mg by mouth Daily., Disp: , Rfl:   •  Magnesium 300 MG capsule, Take 1 capsule by mouth Daily., Disp: , Rfl:   •  TURMERIC PO, Take 1,500 mg by mouth., Disp: , Rfl:   •  verapamil SR (CALAN-SR) 120 MG CR tablet, Take 1 tablet by mouth Daily. (Patient taking differently: Take 120 mg by mouth As Needed.), Disp: 30 tablet, Rfl: 11  •  vitamin D3 125 MCG (5000 UT) capsule capsule, Take 5,000 Units by mouth Daily., Disp: , Rfl:   •  Zinc 25 MG tablet, Take  by mouth., Disp: , Rfl:     Past Medical History, Past Surgical History, Family history, Social History, and Medications were all reviewed with the patient today and updated as necessary.       Patient Active Problem List   Diagnosis   • PVC's (premature ventricular contractions)   • PAC (premature atrial contraction)   • Snoring   • Overweight   • Cerebrovascular accident (CVA) due to occlusion of cerebral artery (CMS/HCC)     No Known Allergies  Past Medical History:   Diagnosis Date   • Arrhythmia 2002    Several tests performed, by Dr. Kei Wilkerson   • Arthritis    • Cerebrovascular accident (CVA) due to occlusion of cerebral artery (CMS/HCC) 2/9/2021   • Tachycardia      Past Surgical History:   Procedure Laterality Date   • CARDIAC CATHETERIZATION  2002    performed by Dr Wilkerson, to rule out pulmanary artery clot   • TONSILLECTOMY       Family History   Problem Relation Age of Onset   • Arrhythmia Mother    • Heart failure Mother    • Hypertension Mother    • Heart disease Mother    • COPD Mother    • Heart attack Father    • Hypertension Father    • Heart disease Father    • Cancer Father         lung   • Arrhythmia Sister    • Hypertension Sister    • Sleep apnea Sister    • Arrhythmia Maternal Grandmother    • Heart failure Maternal Grandmother    • Hypertension Maternal Grandmother    • Arrhythmia Maternal Grandfather    • Heart attack Maternal Grandfather    • Heart failure Maternal Grandfather    • Heart failure Paternal Grandfather    •  "Hypertension Paternal Grandmother      Social History     Tobacco Use   • Smoking status: Never Smoker   • Smokeless tobacco: Never Used   Substance Use Topics   • Alcohol use: Yes     Alcohol/week: 2.0 - 7.0 standard drinks     Types: 1 - 3 Cans of beer, 1 - 4 Shots of liquor per week     Frequency: 4 or more times a week     Comment: occas         PHYSICAL EXAM:    /76 (BP Location: Right arm, Patient Position: Sitting)   Pulse 63   Ht 175.3 cm (69\")   Wt 78.3 kg (172 lb 9.6 oz)   SpO2 98%   BMI 25.49 kg/m²        Wt Readings from Last 5 Encounters:   02/09/21 78.3 kg (172 lb 9.6 oz)   09/15/20 72.1 kg (159 lb)   03/17/20 77.6 kg (171 lb)   07/16/19 88 kg (194 lb)   01/16/19 85 kg (187 lb 6.4 oz)       BP Readings from Last 5 Encounters:   02/09/21 118/76   03/17/20 120/64   07/16/19 134/72   01/16/19 118/67   01/08/19 118/68       General-Well Nourished, Well developed  Eyes - PERRLA  Neck- supple, No mass  CV- regular rate and rhythm, no MRG, No edema  Lung- clear bilaterally  Abd- soft, +BS  Musc/skel - Norm strength and range of motion  Skin- warm and dry  Neuro - Alert & Oriented x 3, appropriate mood.        Medical problems and test results were reviewed with the patient today.     No results found for this or any previous visit (from the past 672 hour(s)).      EKG: (EKG has been independently visualized by me and summarized below)      ECG 12 Lead    Date/Time: 2/9/2021 8:42 AM  Performed by: Reed Zabala MD  Authorized by: Reed Zabala MD   Comparison: compared with previous ECG   Similar to previous ECG  Rhythm: sinus rhythm  Rate: normal  QRS axis: normal    Clinical impression: normal ECG             ASSESSMENT and PLAN  1.  PVCs-burden is not enough for ablation.  He does have symptoms correlated to the PVCs and is mostly bothered at night.  We started him on verapamil 120 mg each day.  He had a echo and nuclear stress test with stable findings.  If the verapamil is unable to " suppress the ectopy we will start the patient on flecainide therapy.  2. ? Sleep apnea - Did not get sleep study. Reports that he has lost wt  3. Pre-op surgical clearance - Patient is low cardiac risk. Stable ECHO and Stress test findings.   4. TIA - Followed by neurology - Had a work up at a outside hospital. Was told he didn't need a statin, just ASA. I disagree and feel he should be taking a statin - He will further discuss with his PCP.   5. HTN - Stable on Meds      Return in about 6 months (around 8/9/2021).        Reed Zabala M.D., F.CASE.C.C, F.H.R.S.  Cardiology/Electrophysiology  2/9/2021  08:42 EST

## 2021-02-18 ENCOUNTER — IMMUNIZATION (OUTPATIENT)
Dept: VACCINE CLINIC | Facility: HOSPITAL | Age: 71
End: 2021-02-18

## 2021-02-18 PROCEDURE — 0011A: CPT | Performed by: INTERNAL MEDICINE

## 2021-02-18 PROCEDURE — 91301 HC SARSCO02 VAC 100MCG/0.5ML IM: CPT | Performed by: INTERNAL MEDICINE

## 2021-03-29 ENCOUNTER — APPOINTMENT (OUTPATIENT)
Dept: VACCINE CLINIC | Facility: HOSPITAL | Age: 71
End: 2021-03-29

## 2021-03-29 ENCOUNTER — IMMUNIZATION (OUTPATIENT)
Dept: VACCINE CLINIC | Facility: HOSPITAL | Age: 71
End: 2021-03-29

## 2021-03-29 PROCEDURE — 91301 HC SARSCO02 VAC 100MCG/0.5ML IM: CPT | Performed by: INTERNAL MEDICINE

## 2021-03-29 PROCEDURE — 0012A: CPT | Performed by: INTERNAL MEDICINE

## 2021-09-07 ENCOUNTER — OFFICE VISIT (OUTPATIENT)
Dept: CARDIOLOGY | Facility: CLINIC | Age: 71
End: 2021-09-07

## 2021-09-07 VITALS
HEART RATE: 58 BPM | BODY MASS INDEX: 25.39 KG/M2 | OXYGEN SATURATION: 98 % | HEIGHT: 69 IN | SYSTOLIC BLOOD PRESSURE: 142 MMHG | DIASTOLIC BLOOD PRESSURE: 74 MMHG | WEIGHT: 171.4 LBS

## 2021-09-07 DIAGNOSIS — I49.1 PAC (PREMATURE ATRIAL CONTRACTION): Primary | ICD-10-CM

## 2021-09-07 DIAGNOSIS — I49.3 PVC'S (PREMATURE VENTRICULAR CONTRACTIONS): ICD-10-CM

## 2021-09-07 PROCEDURE — 99213 OFFICE O/P EST LOW 20 MIN: CPT | Performed by: STUDENT IN AN ORGANIZED HEALTH CARE EDUCATION/TRAINING PROGRAM

## 2021-09-07 NOTE — PROGRESS NOTES
Jose Hoffman  1950  PCP: Yury Mari MD    SUBJECTIVE:   Jose Hoffman is a 71 y.o. male seen for a follow up visit regarding the following:     Chief Complaint: Follow up for palps, pvcs, htn     HPI:    Mr. Patel is a 71 year old male who presents today for routine follow-up of his PVCs and PACs. He has lost 35 pounds and eliminated alcohol. His palpitations have completely resolved and he is only taking verapamil as needed. He is playing golf every day it does not rain. He feels great. No chest pain, sob, edema, palps, dizziness, syncope, TIA/CVA symptoms.     History:   Mr. Hoffman is a pleasant 70-year-old gentleman who presented for consult regarding history of palpitations.  He remotely had a workup with Dr. Martinez approximately 4206-1003.  This included a Holter monitor and subsequent stress test that was negative.  He is placed on beta blocker therapy for PVCs but cannot tolerate secondary to bradycardia fatigue symptoms.  Recently however the past several months she's noticed that these symptoms have progressively become worse percent nighttime and tries to sleep.  Irritating for him and keeping him up at night.  He states otherwise able to be physically active without symptoms.  He denies dizziness, near-syncope or syncope.  Denies heart flushed symptoms.          Cardiac PMH: (Old records have been reviewed and summarized below)  1. Palpitations              A)PVC's              B)Remote stress test, evaluation with Dr. Martinez 2007              C)Holter 10/18   5% PVC's with occasional PAC's              D)Intolerance to Beta blockers  2. GERD  3. Diverticulosis  4. Lipomas   5. Questionable Sleep Apnea   6. Cardiac monitor-November 2018-PVC burden 1.2%-symptoms didn't correlate to PVCs.  7. Stress Test - Nov 2018 - Myocardial perfusion imaging indicates a normal myocardial perfusion study with no evidence of ischemia  8. ECHO - Nov 2018 - Left ventricular systolic function is  normal. Calculated EF = 63%      Current Outpatient Medications:   •  aspirin 81 MG chewable tablet, Chew 81 mg Daily., Disp: , Rfl:   •  KRILL OIL PO, Take 1,500 mg by mouth Daily., Disp: , Rfl:   •  lisinopril (PRINIVIL,ZESTRIL) 5 MG tablet, Take 5 mg by mouth Daily., Disp: , Rfl:   •  Magnesium 300 MG capsule, Take 1 capsule by mouth Daily., Disp: , Rfl:   •  TURMERIC PO, Take 1,500 mg by mouth Daily., Disp: , Rfl:   •  verapamil SR (CALAN-SR) 120 MG CR tablet, Take 1 tablet by mouth Daily. (Patient taking differently: Take 120 mg by mouth As Needed.), Disp: 30 tablet, Rfl: 11    Past Medical History, Past Surgical History, Family history, Social History, and Medications were all reviewed with the patient today and updated as necessary.       Patient Active Problem List   Diagnosis   • PVC's (premature ventricular contractions)   • PAC (premature atrial contraction)   • Snoring   • Overweight   • Cerebrovascular accident (CVA) due to occlusion of cerebral artery (CMS/HCC)     No Known Allergies  Past Medical History:   Diagnosis Date   • Arrhythmia 2002    Several tests performed, by Dr. Kei Wilkerson   • Arthritis    • Cerebrovascular accident (CVA) due to occlusion of cerebral artery (CMS/HCC) 2/9/2021   • Tachycardia      Past Surgical History:   Procedure Laterality Date   • CARDIAC CATHETERIZATION  2002    performed by Dr Wilkerson, to rule out pulmanary artery clot   • TONSILLECTOMY       Family History   Problem Relation Age of Onset   • Arrhythmia Mother    • Heart failure Mother    • Hypertension Mother    • Heart disease Mother    • COPD Mother    • Heart attack Father    • Hypertension Father    • Heart disease Father    • Cancer Father         lung   • Arrhythmia Sister    • Hypertension Sister    • Sleep apnea Sister    • Arrhythmia Maternal Grandmother    • Heart failure Maternal Grandmother    • Hypertension Maternal Grandmother    • Arrhythmia Maternal Grandfather    • Heart attack Maternal  "Grandfather    • Heart failure Maternal Grandfather    • Heart failure Paternal Grandfather    • Hypertension Paternal Grandmother      Social History     Tobacco Use   • Smoking status: Never Smoker   • Smokeless tobacco: Never Used   Substance Use Topics   • Alcohol use: Yes     Alcohol/week: 2.0 - 7.0 standard drinks     Types: 1 - 3 Cans of beer, 1 - 4 Shots of liquor per week     Comment: occas         PHYSICAL EXAM:    /74 (BP Location: Left arm, Patient Position: Sitting)   Pulse 58   Ht 175.3 cm (69\")   Wt 77.7 kg (171 lb 6.4 oz)   SpO2 98%   BMI 25.31 kg/m²        Wt Readings from Last 5 Encounters:   09/07/21 77.7 kg (171 lb 6.4 oz)   02/09/21 78.3 kg (172 lb 9.6 oz)   09/15/20 72.1 kg (159 lb)   03/17/20 77.6 kg (171 lb)   07/16/19 88 kg (194 lb)       BP Readings from Last 5 Encounters:   09/07/21 142/74   02/09/21 118/76   03/17/20 120/64   07/16/19 134/72   01/16/19 118/67       General-Well Nourished, Well developed  Eyes - PERRLA  Neck- supple, No mass  CV- regular rate and rhythm, no MRG, No edema  Lung- clear bilaterally  Abd- soft, +BS  Musc/skel - Norm strength and range of motion  Skin- warm and dry  Neuro - Alert & Oriented x 3, appropriate mood.        Medical problems and test results were reviewed with the patient today.     No results found for this or any previous visit (from the past 672 hour(s)).      EKG: (EKG has been independently visualized by me and summarized below)    Procedures     ASSESSMENT and PLAN    1. PVCs  - mild burden, has not previously been enough for RFA   - echocardiogram and nuclear stress test within normal limits   - has lost 35 pounds and adjusted lifestyle- PVCs have resolved   - Verapamil PRN     2. HTN   -stable. Continue Lisinopril.     3. Hx of TIA  -secondary to small vessel disease. Followed by neuro   - continue ASA. Patient should be on statin. To discuss with PCP     Return in about 1 year (around 9/7/2022).        Sheri Quezada PA-C "   Cardiology/Electrophysiology  9/7/2021  09:47 EDT

## 2022-05-16 ENCOUNTER — TRANSCRIBE ORDERS (OUTPATIENT)
Dept: ADMINISTRATIVE | Facility: HOSPITAL | Age: 72
End: 2022-05-16

## 2022-05-16 DIAGNOSIS — G45.9 TIA (TRANSIENT ISCHEMIC ATTACK): Primary | ICD-10-CM

## 2022-05-18 ENCOUNTER — HOSPITAL ENCOUNTER (OUTPATIENT)
Dept: CARDIOLOGY | Facility: HOSPITAL | Age: 72
Discharge: HOME | End: 2022-05-18
Admitting: PSYCHIATRY & NEUROLOGY

## 2022-05-18 VITALS — HEIGHT: 69 IN | WEIGHT: 171 LBS | BODY MASS INDEX: 25.33 KG/M2

## 2022-05-18 DIAGNOSIS — G45.9 TIA (TRANSIENT ISCHEMIC ATTACK): ICD-10-CM

## 2022-05-18 LAB
BH CV XLRA MEAS LEFT DIST CCA EDV: 17.6 CM/SEC
BH CV XLRA MEAS LEFT DIST CCA PSV: 81.5 CM/SEC
BH CV XLRA MEAS LEFT DIST ICA EDV: -22.9 CM/SEC
BH CV XLRA MEAS LEFT DIST ICA PSV: -79.2 CM/SEC
BH CV XLRA MEAS LEFT ICA/CCA RATIO: 0.93
BH CV XLRA MEAS LEFT MID CCA EDV: 22 CM/SEC
BH CV XLRA MEAS LEFT MID CCA PSV: 94.3 CM/SEC
BH CV XLRA MEAS LEFT MID ICA EDV: -30.5 CM/SEC
BH CV XLRA MEAS LEFT MID ICA PSV: -87.4 CM/SEC
BH CV XLRA MEAS LEFT PROX CCA EDV: 25.1 CM/SEC
BH CV XLRA MEAS LEFT PROX CCA PSV: 122 CM/SEC
BH CV XLRA MEAS LEFT PROX ECA EDV: 18.1 CM/SEC
BH CV XLRA MEAS LEFT PROX ECA PSV: 130 CM/SEC
BH CV XLRA MEAS LEFT PROX ICA EDV: 18.2 CM/SEC
BH CV XLRA MEAS LEFT PROX ICA PSV: 86.2 CM/SEC
BH CV XLRA MEAS LEFT PROX SCLA EDV: 9.43 CM/SEC
BH CV XLRA MEAS LEFT PROX SCLA PSV: 130 CM/SEC
BH CV XLRA MEAS LEFT VERTEBRAL A EDV: 12.1 CM/SEC
BH CV XLRA MEAS LEFT VERTEBRAL A PSV: 45.9 CM/SEC
BH CV XLRA MEAS RIGHT DIST CCA EDV: 14.9 CM/SEC
BH CV XLRA MEAS RIGHT DIST CCA PSV: 66 CM/SEC
BH CV XLRA MEAS RIGHT DIST ICA EDV: -34 CM/SEC
BH CV XLRA MEAS RIGHT DIST ICA PSV: -99.7 CM/SEC
BH CV XLRA MEAS RIGHT ICA/CCA RATIO: 1.2
BH CV XLRA MEAS RIGHT MID CCA EDV: 17.7 CM/SEC
BH CV XLRA MEAS RIGHT MID CCA PSV: 70.3 CM/SEC
BH CV XLRA MEAS RIGHT MID ICA EDV: -33.4 CM/SEC
BH CV XLRA MEAS RIGHT MID ICA PSV: -86.8 CM/SEC
BH CV XLRA MEAS RIGHT PROX CCA EDV: 19.3 CM/SEC
BH CV XLRA MEAS RIGHT PROX CCA PSV: 74.5 CM/SEC
BH CV XLRA MEAS RIGHT PROX ECA EDV: -18.1 CM/SEC
BH CV XLRA MEAS RIGHT PROX ECA PSV: -117 CM/SEC
BH CV XLRA MEAS RIGHT PROX ICA EDV: 21.2 CM/SEC
BH CV XLRA MEAS RIGHT PROX ICA PSV: 71.1 CM/SEC
BH CV XLRA MEAS RIGHT PROX SCLA EDV: 7.86 CM/SEC
BH CV XLRA MEAS RIGHT PROX SCLA PSV: 160 CM/SEC
BH CV XLRA MEAS RIGHT VERTEBRAL A EDV: 13.3 CM/SEC
BH CV XLRA MEAS RIGHT VERTEBRAL A PSV: 46.2 CM/SEC
LEFT ARM BP: NORMAL MMHG
MAXIMAL PREDICTED HEART RATE: 149 BPM
RIGHT ARM BP: NORMAL MMHG
STRESS TARGET HR: 127 BPM

## 2022-05-18 PROCEDURE — 93880 EXTRACRANIAL BILAT STUDY: CPT

## 2022-05-18 PROCEDURE — 93880 EXTRACRANIAL BILAT STUDY: CPT | Performed by: INTERNAL MEDICINE

## 2022-09-13 ENCOUNTER — OFFICE VISIT (OUTPATIENT)
Dept: CARDIOLOGY | Facility: CLINIC | Age: 72
End: 2022-09-13

## 2022-09-13 VITALS
WEIGHT: 176 LBS | SYSTOLIC BLOOD PRESSURE: 134 MMHG | HEIGHT: 69 IN | HEART RATE: 68 BPM | BODY MASS INDEX: 26.07 KG/M2 | OXYGEN SATURATION: 99 % | DIASTOLIC BLOOD PRESSURE: 74 MMHG

## 2022-09-13 DIAGNOSIS — I49.3 PVC'S (PREMATURE VENTRICULAR CONTRACTIONS): Primary | ICD-10-CM

## 2022-09-13 PROCEDURE — 99213 OFFICE O/P EST LOW 20 MIN: CPT | Performed by: STUDENT IN AN ORGANIZED HEALTH CARE EDUCATION/TRAINING PROGRAM

## 2022-09-13 NOTE — PROGRESS NOTES
Jose Hoffman  1950  PCP: Yury Mari MD    SUBJECTIVE:   Jose Hoffman is a 72 y.o. male seen for a follow up visit regarding the following:     Chief Complaint: Follow up for palps, pvcs, htn     HPI:    Mr. Patel is a 71 year old male who presents today for routine follow-up of his PVCs.  He was last seen approximately 1 year ago.  He is overall done quite well since then.  He has no further sensation of having any PVCs.  The only thing is notices that on his apple watch it often notices that his heart rate is low.  This is often at night or when he first wakes up.  He has no symptoms related to this.  He remains very active and is not limited at all.  He denies chest pain, shortness of breath, dyspnea on exertion, palpitations, orthopnea, PND, or lower extremity swelling.         History:   Mr. Hoffman is a pleasant 70-year-old gentleman who presented for consult regarding history of palpitations.  He remotely had a workup with Dr. Martinez approximately 1354-6328.  This included a Holter monitor and subsequent stress test that was negative.  He is placed on beta blocker therapy for PVCs but cannot tolerate secondary to bradycardia fatigue symptoms.  Recently however the past several months she's noticed that these symptoms have progressively become worse percent nighttime and tries to sleep.  Irritating for him and keeping him up at night.  He states otherwise able to be physically active without symptoms.  He denies dizziness, near-syncope or syncope.  Denies heart flushed symptoms.          Cardiac PMH: (Old records have been reviewed and summarized below)  1. Palpitations              A)PVC's              B)Remote stress test, evaluation with Dr. Martinez 2007              C)Holter 10/18   5% PVC's with occasional PAC's              D)Intolerance to Beta blockers  2. GERD  3. Diverticulosis  4. Lipomas   5. Questionable Sleep Apnea   6. Cardiac monitor-November 2018-PVC burden 1.2%-symptoms  didn't correlate to PVCs.  7. Stress Test - Nov 2018 - Myocardial perfusion imaging indicates a normal myocardial perfusion study with no evidence of ischemia  8. ECHO - Nov 2018 - Left ventricular systolic function is normal. Calculated EF = 63%      Current Outpatient Medications:   •  aspirin 81 MG chewable tablet, Chew 81 mg Daily., Disp: , Rfl:   •  KRILL OIL PO, Take 1,500 mg by mouth Daily., Disp: , Rfl:   •  Magnesium 300 MG capsule, Take 1 capsule by mouth Daily., Disp: , Rfl:   •  TURMERIC PO, Take 1,500 mg by mouth Daily., Disp: , Rfl:     Past Medical History, Past Surgical History, Family history, Social History, and Medications were all reviewed with the patient today and updated as necessary.       Patient Active Problem List   Diagnosis   • PVC's (premature ventricular contractions)   • PAC (premature atrial contraction)   • Snoring   • Overweight   • Cerebrovascular accident (CVA) due to occlusion of cerebral artery (HCC)     Allergies   Allergen Reactions   • Pollen Extract Other (See Comments)     Seasonal allergies     Past Medical History:   Diagnosis Date   • Arrhythmia 2002    Several tests performed, by Dr. Kei Wilkerson   • Arthritis    • Cerebrovascular accident (CVA) due to occlusion of cerebral artery (HCC) 02/09/2021   • Tachycardia      Past Surgical History:   Procedure Laterality Date   • CARDIAC CATHETERIZATION  2002    performed by Dr Wilkerson, to rule out pulmanary artery clot   • COLONOSCOPY N/A 09/2022   • TONSILLECTOMY     • TOTAL HIP ARTHROPLASTY Left 2020     Family History   Problem Relation Age of Onset   • Arrhythmia Mother    • Heart failure Mother    • Hypertension Mother    • Heart disease Mother    • COPD Mother    • Heart attack Father    • Hypertension Father    • Heart disease Father    • Cancer Father         lung   • Arrhythmia Sister    • Hypertension Sister    • Sleep apnea Sister    • Arrhythmia Maternal Grandmother    • Heart failure Maternal Grandmother    •  "Hypertension Maternal Grandmother    • Arrhythmia Maternal Grandfather    • Heart attack Maternal Grandfather    • Heart failure Maternal Grandfather    • Heart failure Paternal Grandfather    • Hypertension Paternal Grandmother      Social History     Tobacco Use   • Smoking status: Never Smoker   • Smokeless tobacco: Never Used   Substance Use Topics   • Alcohol use: Yes     Alcohol/week: 3.0 - 6.0 standard drinks     Types: 2 Glasses of wine, 1 - 4 Shots of liquor per week     Comment: occas         PHYSICAL EXAM:    /74 (BP Location: Right arm, Patient Position: Sitting, Cuff Size: Adult)   Pulse 68   Ht 175.3 cm (69\")   Wt 79.8 kg (176 lb)   SpO2 99%   BMI 25.99 kg/m²        Wt Readings from Last 5 Encounters:   09/13/22 79.8 kg (176 lb)   05/18/22 77.6 kg (171 lb)   09/07/21 77.7 kg (171 lb 6.4 oz)   02/09/21 78.3 kg (172 lb 9.6 oz)   09/15/20 72.1 kg (159 lb)       BP Readings from Last 5 Encounters:   09/13/22 134/74   09/07/21 142/74   02/09/21 118/76   03/17/20 120/64   07/16/19 134/72       General-Well Nourished, Well developed  Eyes - PERRLA  Neck- supple, No mass  CV- regular rate and rhythm, no MRG, No edema  Lung- clear bilaterally  Abd- soft, +BS  Musc/skel - Norm strength and range of motion  Skin- warm and dry  Neuro - Alert & Oriented x 3, appropriate mood.        Medical problems and test results were reviewed with the patient today.     No results found for this or any previous visit (from the past 672 hour(s)).      ASSESSMENT and PLAN    1. PVCs  -Previously mild burden with symptoms.  These have almost resolved with lifestyle changes.  Not taking any medications currently    2. HTN   -Now off of antihypertensive medicines after weight loss.  He checks his blood pressure at home and has been well controlled.    3. Hx of TIA  -secondary to small vessel disease. Followed by neuro   - continue ASA    Return if symptoms worsen or fail to improve.      "

## 2023-06-25 PROBLEM — Z86.73 H/O TIA (TRANSIENT ISCHEMIC ATTACK) AND STROKE: Status: ACTIVE | Noted: 2023-06-25

## 2023-06-25 PROBLEM — Z86.73 H/O: CVA (CEREBROVASCULAR ACCIDENT): Status: ACTIVE | Noted: 2023-06-25

## 2023-06-25 PROBLEM — K21.9 GERD (GASTROESOPHAGEAL REFLUX DISEASE): Status: ACTIVE | Noted: 2023-06-25

## 2023-06-25 PROBLEM — R29.818 SUSPECTED SLEEP APNEA: Status: ACTIVE | Noted: 2023-06-25

## 2023-06-25 PROBLEM — I63.9 ACUTE ISCHEMIC STROKE: Status: ACTIVE | Noted: 2023-06-25

## 2023-09-14 ENCOUNTER — CALL CENTER PROGRAMS (OUTPATIENT)
Dept: CALL CENTER | Facility: HOSPITAL | Age: 73
End: 2023-09-14
Payer: MEDICARE

## 2023-09-14 NOTE — OUTREACH NOTE
Stroke Faustino Survey      Flowsheet Row Responses   Facility patient discharged from? [facility]   Attempt successful Yes   Call start time 1552   Person spoke with today (if not patient) and relationship Patient   Call end time 1556   Patient location 30 days post discharge if known Home   Was the patient readmitted within 30 days of discharge? No   Could you live alone without any help from another person? Yes   Can you do everything that you were doing right before your stroke even if slower and not as much? Yes   Are you completely back to the way you were right before your stroke? Yes  [Denies any residual stroke symptoms. ]   Can you walk from one room to another without help from another person? Yes   Can the patient sit up in bed without any help? Yes   Call Center Milam Score 0   Faustino score call completed Yes   Comments No residual stroke symptoms reported by patient.            [facility] - Registered Nurse

## 2023-11-15 ENCOUNTER — OFFICE VISIT (OUTPATIENT)
Dept: NEUROLOGY | Facility: CLINIC | Age: 73
End: 2023-11-15
Payer: MEDICARE

## 2023-11-15 VITALS
OXYGEN SATURATION: 98 % | SYSTOLIC BLOOD PRESSURE: 114 MMHG | WEIGHT: 182 LBS | TEMPERATURE: 98.7 F | BODY MASS INDEX: 26.96 KG/M2 | HEIGHT: 69 IN | HEART RATE: 58 BPM | DIASTOLIC BLOOD PRESSURE: 62 MMHG

## 2023-11-15 DIAGNOSIS — I63.50 CEREBROVASCULAR ACCIDENT (CVA) DUE TO OCCLUSION OF CEREBRAL ARTERY: Primary | ICD-10-CM

## 2023-11-15 DIAGNOSIS — Z86.73 H/O TIA (TRANSIENT ISCHEMIC ATTACK) AND STROKE: ICD-10-CM

## 2023-11-15 DIAGNOSIS — I63.9 ACUTE ISCHEMIC STROKE: ICD-10-CM

## 2023-11-15 DIAGNOSIS — R29.818 SUSPECTED SLEEP APNEA: ICD-10-CM

## 2023-11-15 RX ORDER — CHOLECALCIFEROL (VITAMIN D3) 25 MCG
TABLET,CHEWABLE ORAL
COMMUNITY

## 2023-11-15 RX ORDER — ROSUVASTATIN CALCIUM 5 MG/1
5 TABLET, COATED ORAL
COMMUNITY
Start: 2023-10-24

## 2023-11-15 RX ORDER — EZETIMIBE 10 MG/1
10 TABLET ORAL DAILY
Qty: 30 TABLET | Refills: 11 | Status: SHIPPED | OUTPATIENT
Start: 2023-11-15 | End: 2024-11-14

## 2023-11-15 NOTE — PROGRESS NOTES
New Patient Office Visit      Encounter Date: 11/15/2023   Patient Name: Jose Hoffman  : 1950   MRN: [id number]   PCP: Yury Mari MD    Referring Provider: No ref. provider found     Chief Complaint:    Chief Complaint   Patient presents with    Follow-up    Cerebrovascular Accident       History of Present Illness: Jose Hoffman is a 73 y.o. male with past medical history significant for HLD and remote TIA presents to the clinic today for initial follow-up s/p TNK aborted stroke in 2023.  Patient initially presented to [facility] ED 2023 with acute onset expressive aphasia.  Patient was found to have 7 mL perfusion deficit in posterior region of left temporal lobe.  Patient received TNK and symptoms resolved.  MRI brain on  negative for stroke.      TTE on  was negative for PFO and otherwise unremarkable.  CTA of head and neck on  with no evidence of stenosis or occlusion.  Patient was discharged home on  with daily aspirin and high intensity statin.  Post discharge Holter monitor results no evidence of A-fib.  Of note, patient reported severe myalgia s/p atorvastatin 80 mg.  Patient has continued to fail de-escalating doses of atorvastatin and Crestor in the setting of persistent myalgia.  Patient states follow-up LDL result of 50 per his PCP.    Patient presents today with no complaints other than the myalgias noted above.  Patient patient reports intermittent bilateral posterior headaches for approximately 1 month following discharge from hospital.  Headache symptoms have resolved and not recurred.  He denies vision changes, speech difficulty, extremity weakness, and sensory deficits.    Stroke Risk Factors: hyperlipidemia      Subjective      Review of Systems:   Review of Systems   Constitutional:  Negative for activity change.   Eyes:  Negative for visual disturbance.   Respiratory: Negative.     Cardiovascular:  Negative for palpitations.   Gastrointestinal: Negative.     Musculoskeletal:  Positive for myalgias.   Skin: Negative.    Neurological: Negative.    Psychiatric/Behavioral: Negative.         Past Medical History:   Past Medical History:   Diagnosis Date    Arrhythmia 2002    Several tests performed, by Dr. Kei Wilkerson    Arthritis     Cerebrovascular accident (CVA) due to occlusion of cerebral artery 02/09/2021    Factor 5 Leiden mutation, heterozygous     Headache, tension-type Past 3 years    HL (hearing loss) Past 3 years    Migraine 1990    Tachycardia     TIA (transient ischemic attack) 06/25/2023    Vision loss 06/25/2023       Past Surgical History:   Past Surgical History:   Procedure Laterality Date    CARDIAC CATHETERIZATION  2002    performed by Dr Wilkerson, to rule out pulmanary artery clot    COLONOSCOPY N/A 09/2022    TONSILLECTOMY      TOTAL HIP ARTHROPLASTY Left 2020       Family History:   Family History   Problem Relation Age of Onset    Arrhythmia Mother     Heart failure Mother     Hypertension Mother     Heart disease Mother     COPD Mother     Migraines Mother     Neuropathy Mother     Heart attack Father     Hypertension Father     Heart disease Father     Cancer Father         lung    Arrhythmia Sister     Hypertension Sister     Sleep apnea Sister     Arrhythmia Maternal Grandmother     Heart failure Maternal Grandmother     Hypertension Maternal Grandmother     Arrhythmia Maternal Grandfather     Heart attack Maternal Grandfather     Heart failure Maternal Grandfather     Heart failure Paternal Grandfather     Hypertension Paternal Grandmother        Social History:   Social History     Socioeconomic History    Marital status:    Tobacco Use    Smoking status: Never     Passive exposure: Never    Smokeless tobacco: Never   Vaping Use    Vaping Use: Never used   Substance and Sexual Activity    Alcohol use: Yes     Alcohol/week: 3.0 - 6.0 standard drinks of alcohol     Types: 2 Glasses of wine, 1 - 4 Shots of liquor per week     Comment:  "occas    Drug use: No    Sexual activity: Yes     Partners: Female     Birth control/protection: None, Vasectomy       Medications:     Current Outpatient Medications:     aspirin 325 MG tablet, Take 1 tablet by mouth Daily., Disp: 30 tablet, Rfl: 1    Cholecalciferol (D-3-5 PO), Take  by mouth., Disp: , Rfl:     Coenzyme Q10 (COQ10 PO), Take  by mouth., Disp: , Rfl:     Cyanocobalamin (B-12) 1000 MCG capsule, Take  by mouth., Disp: , Rfl:     KRILL OIL PO, Take 1,500 mg by mouth Daily., Disp: , Rfl:     Magnesium 300 MG capsule, Take 1 capsule by mouth Daily., Disp: , Rfl:     rosuvastatin (CRESTOR) 5 MG tablet, Take 1 tablet by mouth every night at bedtime., Disp: , Rfl:     TURMERIC PO, Take 1,500 mg by mouth Daily., Disp: , Rfl:     Allergies:   Allergies   Allergen Reactions    Pollen Extract Other (See Comments)     Seasonal allergies       Objective     Physical Exam:  Vital Signs:   Vitals:    11/15/23 0818   BP: 114/62   Pulse: 58   Temp: 98.7 °F (37.1 °C)   SpO2: 98%   Weight: 82.6 kg (182 lb)   Height: 175.3 cm (69.02\")     Body mass index is 26.86 kg/m².     Physical Exam  Constitutional:       Appearance: Normal appearance.   HENT:      Head: Normocephalic and atraumatic.   Eyes:      Extraocular Movements: Extraocular movements intact.      Pupils: Pupils are equal, round, and reactive to light.   Cardiovascular:      Rate and Rhythm: Normal rate.   Pulmonary:      Effort: Pulmonary effort is normal.   Skin:     General: Skin is warm and dry.   Neurological:      General: No focal deficit present.      Mental Status: He is alert.      Motor: Motor strength is normal.     Coordination: Coordination is intact.   Psychiatric:         Mood and Affect: Mood normal.         Speech: Speech normal.         Behavior: Behavior normal.       Neurological Exam  Mental Status  Alert. Oriented to person, place, time and situation. Speech is normal. Language is fluent with no aphasia.    Cranial Nerves  CN II: Visual " fields full to confrontation.  CN III, IV, VI: Extraocular movements intact bilaterally. Pupils equal round and reactive to light bilaterally.  CN V: Facial sensation is normal.  CN VII: Full and symmetric facial movement.  CN IX, X: Palate elevates symmetrically  CN XI: Shoulder shrug strength is normal.  CN XII: Tongue midline without atrophy or fasciculations.    Motor  Normal muscle bulk throughout. Normal muscle tone. Strength is 5/5 throughout all four extremities.    Sensory  Sensation is intact to light touch, pinprick, vibration and proprioception in all four extremities.    Coordination    Finger-to-nose, rapid alternating movements and heel-to-shin normal bilaterally without dysmetria.    Gait  Normal casual, toe, heel and tandem gait.       NIH Stroke Scale    1a  Level of consciousness: 0=alert; keenly responsive   1b. LOC questions:  0=Answers both questions correctly    1c. LOC commands: 0=Performs both tasks correctly   2.  Best Gaze: 0=normal   3. Visual: 0=No visual loss   4. Facial Palsy: 0=Normal symmetric movement   5a. Motor left arm: 0=No drift, limb holds 90 (or 45) degrees for full 10 seconds   5b.  Motor right arm: 0=No drift, limb holds 90 (or 45) degrees for full 10 seconds   6a. Motor left le=No drift, limb holds 90 (or 45) degrees for full 10 seconds   6b  Motor right le=No drift, limb holds 90 (or 45) degrees for full 10 seconds   7. Limb Ataxia: 0=Absent   8.  Sensory: 0=Normal; no sensory loss   9. Best Language:  0=No aphasia, normal   10. Dysarthria: 0=Normal   11. Extinction and Inattention: 0=No abnormality    Total:   0         Modified Faustino Score: 0        0  No Symptoms    1 No significant disability. Able to carry out all usual activities, despite some symptoms.    2 Slight disability. Able to look after own affairs without assistance, but unable to carry out all previous activities.    3 Moderate disability. Requires some help, but able to walk unassisted.    4  "Moderately severe disability. Unable to attend to own bodily needs without assistance, and unable to walk unassisted.    5 Severe disability. Requires constant nursing care and attention, bedridden, incontinent.    6 Dead        PHQ-9 Depression Screening  Little interest or pleasure in doing things? 0-->not at all   Feeling down, depressed, or hopeless? 0-->not at all   Trouble falling or staying asleep, or sleeping too much?     Feeling tired or having little energy?     Poor appetite or overeating?     Feeling bad about yourself - or that you are a failure or have let yourself or your family down?     Trouble concentrating on things, such as reading the newspaper or watching television?     Moving or speaking so slowly that other people could have noticed? Or the opposite - being so fidgety or restless that you have been moving around a lot more than usual?     Thoughts that you would be better off dead, or of hurting yourself in some way?     PHQ-9 Total Score 0   If you checked off any problems, how difficult have these problems made it for you to do your work, take care of things at home, or get along with other people?       STOP-Bang Score  Have you been diagnosed with Sleep Apnea?: no  Snoring?: no  Tired?: no  Observed?: no  Pressure?: no  Stop Score: 0  Body Mass Index more than 35 kg/m2?: no  Age older than 50 year old?: yes  Neck large? \">17\"/43cm-M, >16\"/41cm-F: no  Gender=Male?: yes  Total Stop-Bang Score: 2    Imaging Reviewed:   CT head on 6/25: No intracranial abnormalities  CT angiogram head and neck on 6/25: No evidence of stenosis or occlusion in major arteries of neck and brain  CT perfusion on 6/25: 7 ml perfusion deficit in posterior region of left temporal lobe  MRI brain without contrast on 6/25: Mild chronic ischemic changes with no evidence of infarct    Laboratory Results:   WBC   Date Value Ref Range Status   06/27/2023 5.65 3.40 - 10.80 10*3/mm3 Final     RBC   Date Value Ref Range " Status   06/27/2023 4.28 4.14 - 5.80 10*6/mm3 Final     Hemoglobin   Date Value Ref Range Status   06/27/2023 13.6 13.0 - 17.7 g/dL Final     Hematocrit   Date Value Ref Range Status   06/27/2023 39.8 37.5 - 51.0 % Final     MCV   Date Value Ref Range Status   06/27/2023 93.0 79.0 - 97.0 fL Final     MCH   Date Value Ref Range Status   06/27/2023 31.8 26.6 - 33.0 pg Final     MCHC   Date Value Ref Range Status   06/27/2023 34.2 31.5 - 35.7 g/dL Final     RDW   Date Value Ref Range Status   06/27/2023 13.4 12.3 - 15.4 % Final     RDW-SD   Date Value Ref Range Status   06/27/2023 46.3 37.0 - 54.0 fl Final     MPV   Date Value Ref Range Status   06/27/2023 10.1 6.0 - 12.0 fL Final     Platelets   Date Value Ref Range Status   06/27/2023 201 140 - 450 10*3/mm3 Final     Neutrophil %   Date Value Ref Range Status   06/27/2023 49.9 42.7 - 76.0 % Final     Lymphocyte %   Date Value Ref Range Status   06/27/2023 37.5 19.6 - 45.3 % Final     Monocyte %   Date Value Ref Range Status   06/27/2023 8.5 5.0 - 12.0 % Final     Eosinophil %   Date Value Ref Range Status   06/27/2023 3.0 0.3 - 6.2 % Final     Basophil %   Date Value Ref Range Status   06/27/2023 0.7 0.0 - 1.5 % Final     Immature Grans %   Date Value Ref Range Status   06/27/2023 0.4 0.0 - 0.5 % Final     Neutrophils, Absolute   Date Value Ref Range Status   06/27/2023 2.82 1.70 - 7.00 10*3/mm3 Final     Lymphocytes, Absolute   Date Value Ref Range Status   06/27/2023 2.12 0.70 - 3.10 10*3/mm3 Final     Monocytes, Absolute   Date Value Ref Range Status   06/27/2023 0.48 0.10 - 0.90 10*3/mm3 Final     Eosinophils, Absolute   Date Value Ref Range Status   06/27/2023 0.17 0.00 - 0.40 10*3/mm3 Final     Basophils, Absolute   Date Value Ref Range Status   06/27/2023 0.04 0.00 - 0.20 10*3/mm3 Final     Immature Grans, Absolute   Date Value Ref Range Status   06/27/2023 0.02 0.00 - 0.05 10*3/mm3 Final     nRBC   Date Value Ref Range Status   06/27/2023 0.0 0.0 - 0.2 /100  WBC Final      Lab Results   Component Value Date    GLUCOSE 102 (H) 06/27/2023    BUN 19 06/27/2023    CREATININE 0.93 06/27/2023    EGFR 87.2 06/27/2023    BCR 20.4 06/27/2023    K 4.1 06/27/2023    CO2 25.0 06/27/2023    CALCIUM 9.5 06/27/2023    AST 27 06/25/2023    ALT 21 06/25/2023     LDL on 6/26: 114  A1c on 6/26: 5.20   Holter monitor 6/27: No evidence of atrial fibrillation      Assessment / Plan      Assessment/Plan:   Diagnoses and all orders for this visit:    Cerebrovascular accident (CVA) (Primary)  H/O TNK aborted acute ischemic stroke of left temporal lobe  H/O TIA (transient ischemic attack)  -Etiology unclear, likely small vessel disease in setting of hyperlipidemia.  Cardioembolic work-up negative.  No evidence of PFO.  CTA negative for significant atherosclerotic disease in major arteries of head and neck.  -Continue daily aspirin  -D/C Crestor and begin cholesterol therapy with Zetia in setting of persistent myalgias while on statin therapy  -Patient is normotensive and normoglycemic at this time.  Continue to follow with PCP for monitoring for future development of hypertension or hyperglycemia.  -Patient is a non-smoker and reports only occasional use of alcohol  -Encouraged continued management of modifiable risk factors to include glycemic control, blood pressure management, and regular exercise    Suspected sleep apnea  -Patient reports occasional episodes of waking up in the middle of the night secondary to snoring, which she associates with greater than normal alcohol use prior to bedtime.  -STOP-BANG score of 2  -Follow-up with PCP for sleep medicine evaluation if symptoms worsen         Discussed the importance of medication compliance with ASA and Zetia and lifestyle modifications (adequate blood pressure control, adequate control of hyperlipidemia, adequate glycemic control, increase physical activity, and healthy diet) to help reduce the risk of future cerebrovascular events.  Also  discussed the signs symptoms that would warrant the patient return back to the emergency department including unilateral weakness, unilateral numbness, visual disturbances, loss of balance, speech difficulties, and/or a sudden severe headache.        Follow Up:   Return in about 3 months (around 2/15/2024).      Time spent: 60 minutes including chart review and face-to-face time    Manuel Jaime PA-C  [facility]

## 2023-12-01 PROBLEM — Z86.73 HISTORY OF STROKE: Status: ACTIVE | Noted: 2023-12-01

## 2024-09-25 ENCOUNTER — TRANSCRIBE ORDERS (OUTPATIENT)
Dept: ADMINISTRATIVE | Facility: HOSPITAL | Age: 74
End: 2024-09-25
Payer: MEDICARE

## 2024-09-25 ENCOUNTER — HOSPITAL ENCOUNTER (OUTPATIENT)
Dept: MRI IMAGING | Facility: HOSPITAL | Age: 74
Discharge: HOME | End: 2024-09-25
Admitting: FAMILY MEDICINE
Payer: MEDICARE

## 2024-09-25 DIAGNOSIS — R10.31 RIGHT GROIN PAIN: Primary | ICD-10-CM

## 2024-09-25 DIAGNOSIS — R10.31 RIGHT GROIN PAIN: ICD-10-CM

## 2024-09-25 PROCEDURE — 72195 MRI PELVIS W/O DYE: CPT

## 2025-01-10 ENCOUNTER — TELEPHONE (OUTPATIENT)
Dept: CARDIOLOGY | Facility: CLINIC | Age: 75
End: 2025-01-10

## 2025-01-10 NOTE — TELEPHONE ENCOUNTER
REQUEST FOR CARDIAC CLEARANCE    Caller name: Jose Hoffman     Phone Number: [phone]    Surgeon's name: DR. DORI OZUNA AT [facility]     Type of planned surgery: FULL HIP REPLACEMENT     Date of planned surgery: 2.26.25     Type of anesthesia: NA     Have you been experiencing chest pain or shortness of breath? NO    Is your doctor requesting for you to stop any of your medications prior to your surgery? ASPIRIN 325 MG     Where should we fax the clearance to? [phone]

## 2025-01-16 ENCOUNTER — OFFICE VISIT (OUTPATIENT)
Dept: CARDIOLOGY | Facility: CLINIC | Age: 75
End: 2025-01-16
Payer: MEDICARE

## 2025-01-16 VITALS
SYSTOLIC BLOOD PRESSURE: 132 MMHG | OXYGEN SATURATION: 97 % | BODY MASS INDEX: 25.48 KG/M2 | HEART RATE: 58 BPM | DIASTOLIC BLOOD PRESSURE: 56 MMHG | HEIGHT: 69 IN | WEIGHT: 172 LBS

## 2025-01-16 DIAGNOSIS — I49.1 PAC (PREMATURE ATRIAL CONTRACTION): ICD-10-CM

## 2025-01-16 DIAGNOSIS — I49.3 PVC'S (PREMATURE VENTRICULAR CONTRACTIONS): Primary | ICD-10-CM

## 2025-01-16 PROCEDURE — 99214 OFFICE O/P EST MOD 30 MIN: CPT | Performed by: PHYSICIAN ASSISTANT

## 2025-01-16 PROCEDURE — 93000 ELECTROCARDIOGRAM COMPLETE: CPT | Performed by: PHYSICIAN ASSISTANT

## 2025-01-16 RX ORDER — ASPIRIN 81 MG/1
162 TABLET ORAL DAILY
COMMUNITY

## 2025-01-16 NOTE — PROGRESS NOTES
[facility]   OFFICE NOTE      Jose Hoffman  1950  PCP: Yury Mari MD    SUBJECTIVE:   Jose Hoffman is a 74 y.o. male seen for a follow up visit regarding the following:     CC:    HPI:   Pleasant 74-year-old gentleman last seen in our office 2022 for PVCs.  He has been following with Dr. Zabala in the past has had previous workup Dr. Martinez remotely which included stress test echocardiograms and Holter monitors.  PVCs and have been overly bothersome to him.  He has not been treated for the management tolerate the beta-blockers they caused fatigue.  He is not having chest pain or shortness of breath/angina pectoris.  Denies any heart failure symptoms.  States he is getting hip surgery and needs evaluation.  Overall he feels healthy since last seen by our office.    Cardiac PMH: (Old records have been reviewed and summarized below)  Palpitations  PVCs, 5%, occasional PACs Holter October 2018  Intolerance to beta-blocker therapy  Stress test 2019 marker perfusion imaging normal EF no ischemia  Echocardiogram EF 2018, EF 63%  Echocardiogram EF 65% mild MR, Mild RVE  Cerebrovascular disease, TIA due to small vessel disease, followed by neurology.  He remains on aspirin therapy but declined statin due to myalgias  Hypertension  GERD  Sleep apnea  Alcohol use, recent 35 pound weight loss      Past Medical History, Past Surgical History, Family history, Social History, and Medications were all reviewed with the patient today and updated as necessary.       Current Outpatient Medications:     aspirin 81 MG EC tablet, Take 2 tablets by mouth Daily., Disp: , Rfl:     Cholecalciferol (D-3-5 PO), Take  by mouth. (Patient taking differently: Take 6,000 Units by mouth.), Disp: , Rfl:     Coenzyme Q10 (COQ10 PO), Take  by mouth., Disp: , Rfl:     Cyanocobalamin (B-12) 1000 MCG capsule, Take  by mouth., Disp: , Rfl:     diclofenac (VOLTAREN) 50 MG EC tablet, Take 1 tablet by  "mouth As Needed., Disp: , Rfl:     KRILL OIL PO, Take 1,500 mg by mouth Daily. (Patient taking differently: Take 6,000 mg by mouth Daily.), Disp: , Rfl:     Magnesium 300 MG capsule, Take 1 capsule by mouth Daily. (Patient taking differently: Take 1 capsule by mouth Daily. Taking 400 mg), Disp: , Rfl:     Menaquinone-7 (K2 PO), Take 100 mg by mouth., Disp: , Rfl:     TURMERIC PO, Take 1,500 mg by mouth Daily. (Patient taking differently: Take 2,000 mg by mouth Daily.), Disp: , Rfl:     aspirin 325 MG tablet, Take 1 tablet by mouth Daily. (Patient not taking: Reported on 1/16/2025), Disp: 30 tablet, Rfl: 1    ezetimibe (Zetia) 10 MG tablet, Take 1 tablet by mouth Daily. (Patient not taking: Reported on 1/16/2025), Disp: 30 tablet, Rfl: 11    rosuvastatin (CRESTOR) 5 MG tablet, Take 1 tablet by mouth every night at bedtime. (Patient not taking: Reported on 1/16/2025), Disp: , Rfl:       Allergies   Allergen Reactions    Pollen Extract Other (See Comments)     Seasonal allergies         PHYSICAL EXAM:    /56 (BP Location: Right arm, Patient Position: Sitting, Cuff Size: Adult)   Pulse 58   Ht 175.3 cm (69\")   Wt 78 kg (172 lb)   SpO2 97%   BMI 25.40 kg/m²        Wt Readings from Last 5 Encounters:   01/16/25 78 kg (172 lb)   09/25/24 76.2 kg (168 lb)   11/15/23 82.6 kg (182 lb)   06/27/23 78 kg (171 lb 15.3 oz)   09/13/22 79.8 kg (176 lb)       BP Readings from Last 5 Encounters:   01/16/25 132/56   11/15/23 114/62   06/27/23 116/68   09/13/22 134/74   09/07/21 142/74       General appearance - Alert, well appearing, and in no distress   Mental status - Affect appropriate to mood.  Eyes - Sclerae anicteric,  ENMT - Hearing grossly normal bilaterally, Dental hygiene good.  Neck - Carotids upstroke normal bilaterally, no bruits, no JVD.  Resp - Clear to auscultation, no wheezes, rales or rhonchi, symmetric air entry.  Heart - Normal rate, regular rhythm, normal S1, S2, no murmurs, rubs, clicks or gallops.  GI " - Soft, nontender, nondistended, no masses or organomegaly.  Neurological - Grossly intact - normal speech, no focal findings  Musculoskeletal - No joint tenderness, deformity or swelling, no muscular tenderness noted.  Extremities - Peripheral pulses normal, no pedal edema, no clubbing or cyanosis.  Skin - Normal coloration and turgor.  Psych -  oriented to person, place, and time.    Medical problems and test results were reviewed with the patient today.     No results found for this or any previous visit (from the past 4 weeks).      EKG: (EKG has been independently visualized by me and summarized below)    ECG 12 Lead    Date/Time: 2025 3:35 PM  Performed by: Jose Lynn PA    Authorized by: Jose Lynn PA  Comparison: compared with previous ECG from 2024  Similar to previous ECG  Rhythm: sinus rhythm  Rate: normal  Conduction: conduction normal  QRS axis: normal    Clinical impression: normal ECG         Surgical Cardiac Risk Assessment    Patient Name: Jose Hoffman : 1950 MRN: [id number]    Revised Cardiac Risk Index (RCRI)     Clinical Risk Factors  Check if Present or Past    History    High-risk type of surgery (examples include vascular surgery and any open intraperitoneal or intrathoracic procedure) []   +1 point     History of ischemic heart disease (history of myocardial infarction or a positive exercise test, current complaint of chest pain considered to be secondary to myocardial ischemia, use of nitrate therapy, or ECG with pathological Q waves; do not count prior coronary revascularization procedures unless one of the other criteria for ischemic heart disease is present) []   +1 point     History of heart failure []   +1 point     History of cerebrovascular disease []   +1 point     Diabetes mellitus requiring treatment with insulin   []   +1 point   Preoperative serum creatinine >2.0 mg/dL   (177 micromol/L)   []   +1 point       Rate of cardiac death, nonfatal  myocardial infarction, and nonfatal cardiac arrest according to the number of predictors   Total Points       [x] 0= 0.4% (95% CI 0.1-0.8)       [] 1= 1.0% (95% CI 0.5-1.4)       [] 2= 2.4% (95% CI 1.3-3.5)       [] 3 or more= 5.4% (95% CI 2.8-7.9)         Rate of myocardial infarction, pulmonary edema, ventricular fibrillation, primary cardiac arrest, and complete heart block   Total Points       [x] 0= 0.5% (95% CI 0.2-1.1)       [] 1= 1.3% (95% CI 0.7-2.1)       [] 2= 3.6% (95% CI 2.1-5.6)       [] 3 or more= 9.1% (95% CI 5.5-13.8)     Recommendations    [x] Careful hemodynamic control (I.e, HR 60-70 bpm, no hypotension)  [x] If patient has been on beta blocker, continue throughout pre-operative period, if stable BP.  [x] Continue pre-op statin if no contraindications  [x] Hold ACE/ARB/Entresto 24 hours before and after if stable BP.  [x] Avoid initiation of Clonidine pre-op.   [x] EKG post op recovery room if risk is > 5%.    ASSESSMENT   1.  PVCs minimally symptomatic previously remote stress test 2018 with normal LV function.  Most recent LY June 26 negative for PFO and normal LV function.    2.  Hypertension: Controlled with diet weight loss    3.  Remote TIA/cerebrovascular due to small vessel disease followed by neurology.  Was in 2003 and treated with medical management.    4.  Hyperlipidemia: He declined statin,He had myalgias with statins     5.  Sleep apnea, resolved with weight loss     PLAN  Patient is doing well since last seen per office he has no anginal heart failure symptoms.  EKG today is normal.  He has rarely has PVCs and is noted that if he reduce his alcohol and caffeine he is resolved.  He has a revised cardiac risk index less than 1% is considered except risk to pursue hip surgery.  Would like him to take statin therapy for risk factor modification regarding his previous TIA but he declines and states myalgias to severe.  He return to follow-up with her office in 1 year or sooner as  needed.          1/16/2025  15:34 EST   Electronically signed by KADEN Neri, 01/16/25, 3:34 PM EST.